# Patient Record
Sex: FEMALE | Race: WHITE | NOT HISPANIC OR LATINO | URBAN - METROPOLITAN AREA
[De-identification: names, ages, dates, MRNs, and addresses within clinical notes are randomized per-mention and may not be internally consistent; named-entity substitution may affect disease eponyms.]

---

## 2017-05-04 ENCOUNTER — OUTPATIENT (OUTPATIENT)
Dept: OUTPATIENT SERVICES | Facility: HOSPITAL | Age: 32
LOS: 1 days | End: 2017-05-04
Payer: COMMERCIAL

## 2017-05-04 DIAGNOSIS — Z01.818 ENCOUNTER FOR OTHER PREPROCEDURAL EXAMINATION: ICD-10-CM

## 2017-05-04 DIAGNOSIS — O34.212 MATERNAL CARE FOR VERTICAL SCAR FROM PREVIOUS CESAREAN DELIVERY: ICD-10-CM

## 2017-05-04 LAB
BLD GP AB SCN SERPL QL: NEGATIVE — SIGNIFICANT CHANGE UP
HCT VFR BLD CALC: 32.6 % — LOW (ref 34.5–45)
HGB BLD-MCNC: 10.9 G/DL — LOW (ref 11.5–15.5)
MCHC RBC-ENTMCNC: 26.2 PG — LOW (ref 27–34)
MCHC RBC-ENTMCNC: 33.4 GM/DL — SIGNIFICANT CHANGE UP (ref 32–36)
MCV RBC AUTO: 78.4 FL — LOW (ref 80–100)
PLATELET # BLD AUTO: 151 K/UL — SIGNIFICANT CHANGE UP (ref 150–400)
RBC # BLD: 4.16 M/UL — SIGNIFICANT CHANGE UP (ref 3.8–5.2)
RBC # FLD: 14.3 % — SIGNIFICANT CHANGE UP (ref 10.3–14.5)
RH IG SCN BLD-IMP: NEGATIVE — SIGNIFICANT CHANGE UP
WBC # BLD: 7.11 K/UL — SIGNIFICANT CHANGE UP (ref 3.8–10.5)
WBC # FLD AUTO: 7.11 K/UL — SIGNIFICANT CHANGE UP (ref 3.8–10.5)

## 2017-05-04 PROCEDURE — 86901 BLOOD TYPING SEROLOGIC RH(D): CPT

## 2017-05-04 PROCEDURE — G0463: CPT

## 2017-05-04 PROCEDURE — 86850 RBC ANTIBODY SCREEN: CPT

## 2017-05-04 PROCEDURE — 86900 BLOOD TYPING SEROLOGIC ABO: CPT

## 2017-05-04 PROCEDURE — 85027 COMPLETE CBC AUTOMATED: CPT

## 2017-05-05 ENCOUNTER — TRANSCRIPTION ENCOUNTER (OUTPATIENT)
Age: 32
End: 2017-05-05

## 2017-05-05 ENCOUNTER — INPATIENT (INPATIENT)
Facility: HOSPITAL | Age: 32
LOS: 2 days | Discharge: HOME | End: 2017-05-08
Attending: OBSTETRICS & GYNECOLOGY | Admitting: OBSTETRICS & GYNECOLOGY
Payer: COMMERCIAL

## 2017-05-05 VITALS — WEIGHT: 158.73 LBS | HEIGHT: 62 IN

## 2017-05-05 DIAGNOSIS — Z01.818 ENCOUNTER FOR OTHER PREPROCEDURAL EXAMINATION: ICD-10-CM

## 2017-05-05 DIAGNOSIS — O34.212 MATERNAL CARE FOR VERTICAL SCAR FROM PREVIOUS CESAREAN DELIVERY: ICD-10-CM

## 2017-05-05 LAB — T PALLIDUM AB TITR SER: NEGATIVE — SIGNIFICANT CHANGE UP

## 2017-05-05 RX ORDER — DEXAMETHASONE 0.5 MG/5ML
4 ELIXIR ORAL EVERY 6 HOURS
Qty: 0 | Refills: 0 | Status: DISCONTINUED | OUTPATIENT
Start: 2017-05-05 | End: 2017-05-06

## 2017-05-05 RX ORDER — METOCLOPRAMIDE HCL 10 MG
10 TABLET ORAL ONCE
Qty: 0 | Refills: 0 | Status: DISCONTINUED | OUTPATIENT
Start: 2017-05-05 | End: 2017-05-05

## 2017-05-05 RX ORDER — SODIUM CHLORIDE 9 MG/ML
1000 INJECTION, SOLUTION INTRAVENOUS
Qty: 0 | Refills: 0 | Status: DISCONTINUED | OUTPATIENT
Start: 2017-05-05 | End: 2017-05-05

## 2017-05-05 RX ORDER — OXYTOCIN 10 UNIT/ML
333.33 VIAL (ML) INJECTION
Qty: 20 | Refills: 0 | Status: DISCONTINUED | OUTPATIENT
Start: 2017-05-05 | End: 2017-05-08

## 2017-05-05 RX ORDER — LANOLIN
1 OINTMENT (GRAM) TOPICAL
Qty: 0 | Refills: 0 | Status: DISCONTINUED | OUTPATIENT
Start: 2017-05-05 | End: 2017-05-08

## 2017-05-05 RX ORDER — FERROUS SULFATE 325(65) MG
325 TABLET ORAL DAILY
Qty: 0 | Refills: 0 | Status: DISCONTINUED | OUTPATIENT
Start: 2017-05-05 | End: 2017-05-08

## 2017-05-05 RX ORDER — SIMETHICONE 80 MG/1
80 TABLET, CHEWABLE ORAL EVERY 4 HOURS
Qty: 0 | Refills: 0 | Status: DISCONTINUED | OUTPATIENT
Start: 2017-05-05 | End: 2017-05-08

## 2017-05-05 RX ORDER — CEFAZOLIN SODIUM 1 G
2000 VIAL (EA) INJECTION EVERY 8 HOURS
Qty: 0 | Refills: 0 | Status: COMPLETED | OUTPATIENT
Start: 2017-05-05 | End: 2017-05-06

## 2017-05-05 RX ORDER — KETOROLAC TROMETHAMINE 30 MG/ML
30 SYRINGE (ML) INJECTION EVERY 6 HOURS
Qty: 0 | Refills: 0 | Status: DISCONTINUED | OUTPATIENT
Start: 2017-05-05 | End: 2017-05-07

## 2017-05-05 RX ORDER — OXYCODONE HYDROCHLORIDE 5 MG/1
5 TABLET ORAL
Qty: 0 | Refills: 0 | Status: COMPLETED | OUTPATIENT
Start: 2017-05-05 | End: 2017-05-12

## 2017-05-05 RX ORDER — GLYCERIN ADULT
1 SUPPOSITORY, RECTAL RECTAL AT BEDTIME
Qty: 0 | Refills: 0 | Status: DISCONTINUED | OUTPATIENT
Start: 2017-05-05 | End: 2017-05-08

## 2017-05-05 RX ORDER — CEFAZOLIN SODIUM 1 G
2000 VIAL (EA) INJECTION ONCE
Qty: 0 | Refills: 0 | Status: COMPLETED | OUTPATIENT
Start: 2017-05-05 | End: 2017-05-05

## 2017-05-05 RX ORDER — DOCUSATE SODIUM 100 MG
100 CAPSULE ORAL
Qty: 0 | Refills: 0 | Status: DISCONTINUED | OUTPATIENT
Start: 2017-05-05 | End: 2017-05-08

## 2017-05-05 RX ORDER — SODIUM CHLORIDE 9 MG/ML
1000 INJECTION, SOLUTION INTRAVENOUS
Qty: 0 | Refills: 0 | Status: DISCONTINUED | OUTPATIENT
Start: 2017-05-05 | End: 2017-05-08

## 2017-05-05 RX ORDER — TETANUS TOXOID, REDUCED DIPHTHERIA TOXOID AND ACELLULAR PERTUSSIS VACCINE, ADSORBED 5; 2.5; 8; 8; 2.5 [IU]/.5ML; [IU]/.5ML; UG/.5ML; UG/.5ML; UG/.5ML
0.5 SUSPENSION INTRAMUSCULAR ONCE
Qty: 0 | Refills: 0 | Status: DISCONTINUED | OUTPATIENT
Start: 2017-05-05 | End: 2017-05-08

## 2017-05-05 RX ORDER — HEPARIN SODIUM 5000 [USP'U]/ML
5000 INJECTION INTRAVENOUS; SUBCUTANEOUS EVERY 12 HOURS
Qty: 0 | Refills: 0 | Status: DISCONTINUED | OUTPATIENT
Start: 2017-05-05 | End: 2017-05-08

## 2017-05-05 RX ORDER — OXYTOCIN 10 UNIT/ML
41.67 VIAL (ML) INJECTION
Qty: 20 | Refills: 0 | Status: DISCONTINUED | OUTPATIENT
Start: 2017-05-05 | End: 2017-05-08

## 2017-05-05 RX ORDER — OXYTOCIN 10 UNIT/ML
41.67 VIAL (ML) INJECTION
Qty: 20 | Refills: 0 | Status: DISCONTINUED | OUTPATIENT
Start: 2017-05-05 | End: 2017-05-05

## 2017-05-05 RX ORDER — CEFAZOLIN SODIUM 1 G
2000 VIAL (EA) INJECTION ONCE
Qty: 0 | Refills: 0 | Status: DISCONTINUED | OUTPATIENT
Start: 2017-05-05 | End: 2017-05-05

## 2017-05-05 RX ORDER — IBUPROFEN 200 MG
600 TABLET ORAL EVERY 6 HOURS
Qty: 0 | Refills: 0 | Status: COMPLETED | OUTPATIENT
Start: 2017-05-05 | End: 2018-04-03

## 2017-05-05 RX ORDER — ACETAMINOPHEN 500 MG
975 TABLET ORAL EVERY 6 HOURS
Qty: 0 | Refills: 0 | Status: COMPLETED | OUTPATIENT
Start: 2017-05-05 | End: 2018-04-03

## 2017-05-05 RX ORDER — DIPHENHYDRAMINE HCL 50 MG
25 CAPSULE ORAL EVERY 6 HOURS
Qty: 0 | Refills: 0 | Status: DISCONTINUED | OUTPATIENT
Start: 2017-05-05 | End: 2017-05-08

## 2017-05-05 RX ORDER — CITRIC ACID/SODIUM CITRATE 300-500 MG
15 SOLUTION, ORAL ORAL ONCE
Qty: 0 | Refills: 0 | Status: COMPLETED | OUTPATIENT
Start: 2017-05-05 | End: 2017-05-05

## 2017-05-05 RX ORDER — DEXAMETHASONE 0.5 MG/5ML
4 ELIXIR ORAL EVERY 6 HOURS
Qty: 0 | Refills: 0 | Status: DISCONTINUED | OUTPATIENT
Start: 2017-05-05 | End: 2017-05-05

## 2017-05-05 RX ORDER — FAMOTIDINE 10 MG/ML
20 INJECTION INTRAVENOUS ONCE
Qty: 0 | Refills: 0 | Status: COMPLETED | OUTPATIENT
Start: 2017-05-05 | End: 2017-05-05

## 2017-05-05 RX ORDER — INFLUENZA VIRUS VACCINE 15; 15; 15; 15 UG/.5ML; UG/.5ML; UG/.5ML; UG/.5ML
0.5 SUSPENSION INTRAMUSCULAR ONCE
Qty: 0 | Refills: 0 | Status: DISCONTINUED | OUTPATIENT
Start: 2017-05-05 | End: 2017-05-08

## 2017-05-05 RX ORDER — ONDANSETRON 8 MG/1
4 TABLET, FILM COATED ORAL EVERY 6 HOURS
Qty: 0 | Refills: 0 | Status: DISCONTINUED | OUTPATIENT
Start: 2017-05-05 | End: 2017-05-06

## 2017-05-05 RX ORDER — OXYCODONE HYDROCHLORIDE 5 MG/1
5 TABLET ORAL EVERY 4 HOURS
Qty: 0 | Refills: 0 | Status: COMPLETED | OUTPATIENT
Start: 2017-05-06 | End: 2017-05-13

## 2017-05-05 RX ORDER — NALOXONE HYDROCHLORIDE 4 MG/.1ML
0.1 SPRAY NASAL
Qty: 0 | Refills: 0 | Status: DISCONTINUED | OUTPATIENT
Start: 2017-05-05 | End: 2017-05-06

## 2017-05-05 RX ORDER — ONDANSETRON 8 MG/1
4 TABLET, FILM COATED ORAL EVERY 6 HOURS
Qty: 0 | Refills: 0 | Status: DISCONTINUED | OUTPATIENT
Start: 2017-05-05 | End: 2017-05-05

## 2017-05-05 RX ORDER — SODIUM CHLORIDE 9 MG/ML
1000 INJECTION, SOLUTION INTRAVENOUS ONCE
Qty: 0 | Refills: 0 | Status: COMPLETED | OUTPATIENT
Start: 2017-05-05 | End: 2017-05-05

## 2017-05-05 RX ADMIN — Medication 100 MILLIGRAM(S): at 12:24

## 2017-05-05 RX ADMIN — HEPARIN SODIUM 5000 UNIT(S): 5000 INJECTION INTRAVENOUS; SUBCUTANEOUS at 20:47

## 2017-05-05 RX ADMIN — FAMOTIDINE 20 MILLIGRAM(S): 10 INJECTION INTRAVENOUS at 11:54

## 2017-05-05 RX ADMIN — Medication 15 MILLILITER(S): at 11:54

## 2017-05-05 RX ADMIN — Medication 100 MILLIGRAM(S): at 21:59

## 2017-05-05 RX ADMIN — Medication 30 MILLIGRAM(S): at 20:47

## 2017-05-05 RX ADMIN — Medication 30 MILLIGRAM(S): at 14:22

## 2017-05-05 RX ADMIN — SODIUM CHLORIDE 2000 MILLILITER(S): 9 INJECTION, SOLUTION INTRAVENOUS at 11:45

## 2017-05-05 RX ADMIN — Medication 125 MILLIUNIT(S)/MIN: at 14:22

## 2017-05-05 RX ADMIN — Medication 30 MILLIGRAM(S): at 21:20

## 2017-05-05 RX ADMIN — Medication 125 MILLIUNIT(S)/MIN: at 14:20

## 2017-05-06 LAB
HCT VFR BLD CALC: 28 % — LOW (ref 34.5–45)
HGB BLD-MCNC: 10.1 G/DL — LOW (ref 11.5–15.5)
KLEIHAUER-BETKE CALCULATION: 0.1 % — SIGNIFICANT CHANGE UP (ref 0–0.3)
MCHC RBC-ENTMCNC: 28.2 PG — SIGNIFICANT CHANGE UP (ref 27–34)
MCHC RBC-ENTMCNC: 35.9 GM/DL — SIGNIFICANT CHANGE UP (ref 32–36)
MCV RBC AUTO: 78.7 FL — LOW (ref 80–100)
PLATELET # BLD AUTO: 114 K/UL — LOW (ref 150–400)
RBC # BLD: 3.56 M/UL — LOW (ref 3.8–5.2)
RBC # FLD: 13.6 % — SIGNIFICANT CHANGE UP (ref 10.3–14.5)
WBC # BLD: 7.9 K/UL — SIGNIFICANT CHANGE UP (ref 3.8–10.5)
WBC # FLD AUTO: 7.9 K/UL — SIGNIFICANT CHANGE UP (ref 3.8–10.5)

## 2017-05-06 RX ORDER — OXYCODONE HYDROCHLORIDE 5 MG/1
5 TABLET ORAL
Qty: 0 | Refills: 0 | Status: DISCONTINUED | OUTPATIENT
Start: 2017-05-06 | End: 2017-05-08

## 2017-05-06 RX ORDER — ACETAMINOPHEN 500 MG
975 TABLET ORAL EVERY 6 HOURS
Qty: 0 | Refills: 0 | Status: DISCONTINUED | OUTPATIENT
Start: 2017-05-06 | End: 2017-05-08

## 2017-05-06 RX ORDER — IBUPROFEN 200 MG
600 TABLET ORAL EVERY 6 HOURS
Qty: 0 | Refills: 0 | Status: DISCONTINUED | OUTPATIENT
Start: 2017-05-06 | End: 2017-05-06

## 2017-05-06 RX ORDER — OXYCODONE HYDROCHLORIDE 5 MG/1
5 TABLET ORAL EVERY 4 HOURS
Qty: 0 | Refills: 0 | Status: DISCONTINUED | OUTPATIENT
Start: 2017-05-06 | End: 2017-05-08

## 2017-05-06 RX ADMIN — Medication 975 MILLIGRAM(S): at 10:00

## 2017-05-06 RX ADMIN — Medication 975 MILLIGRAM(S): at 15:24

## 2017-05-06 RX ADMIN — OXYCODONE HYDROCHLORIDE 5 MILLIGRAM(S): 5 TABLET ORAL at 15:24

## 2017-05-06 RX ADMIN — HEPARIN SODIUM 5000 UNIT(S): 5000 INJECTION INTRAVENOUS; SUBCUTANEOUS at 21:07

## 2017-05-06 RX ADMIN — Medication 975 MILLIGRAM(S): at 21:07

## 2017-05-06 RX ADMIN — OXYCODONE HYDROCHLORIDE 5 MILLIGRAM(S): 5 TABLET ORAL at 09:27

## 2017-05-06 RX ADMIN — Medication 975 MILLIGRAM(S): at 21:45

## 2017-05-06 RX ADMIN — Medication 325 MILLIGRAM(S): at 11:48

## 2017-05-06 RX ADMIN — Medication 1 TABLET(S): at 11:47

## 2017-05-06 RX ADMIN — OXYCODONE HYDROCHLORIDE 5 MILLIGRAM(S): 5 TABLET ORAL at 12:20

## 2017-05-06 RX ADMIN — Medication 25 MILLIGRAM(S): at 01:49

## 2017-05-06 RX ADMIN — OXYCODONE HYDROCHLORIDE 5 MILLIGRAM(S): 5 TABLET ORAL at 13:00

## 2017-05-06 RX ADMIN — Medication 30 MILLIGRAM(S): at 11:48

## 2017-05-06 RX ADMIN — OXYCODONE HYDROCHLORIDE 5 MILLIGRAM(S): 5 TABLET ORAL at 18:13

## 2017-05-06 RX ADMIN — OXYCODONE HYDROCHLORIDE 5 MILLIGRAM(S): 5 TABLET ORAL at 21:07

## 2017-05-06 RX ADMIN — Medication 30 MILLIGRAM(S): at 12:15

## 2017-05-06 RX ADMIN — Medication 975 MILLIGRAM(S): at 09:27

## 2017-05-06 RX ADMIN — OXYCODONE HYDROCHLORIDE 5 MILLIGRAM(S): 5 TABLET ORAL at 16:00

## 2017-05-06 RX ADMIN — OXYCODONE HYDROCHLORIDE 5 MILLIGRAM(S): 5 TABLET ORAL at 10:00

## 2017-05-06 RX ADMIN — Medication 30 MILLIGRAM(S): at 18:13

## 2017-05-06 RX ADMIN — Medication 100 MILLIGRAM(S): at 06:24

## 2017-05-06 RX ADMIN — OXYCODONE HYDROCHLORIDE 5 MILLIGRAM(S): 5 TABLET ORAL at 21:45

## 2017-05-06 RX ADMIN — Medication 975 MILLIGRAM(S): at 16:10

## 2017-05-06 RX ADMIN — HEPARIN SODIUM 5000 UNIT(S): 5000 INJECTION INTRAVENOUS; SUBCUTANEOUS at 09:27

## 2017-05-06 RX ADMIN — Medication 30 MILLIGRAM(S): at 18:58

## 2017-05-06 RX ADMIN — OXYCODONE HYDROCHLORIDE 5 MILLIGRAM(S): 5 TABLET ORAL at 18:58

## 2017-05-07 RX ORDER — IBUPROFEN 200 MG
600 TABLET ORAL EVERY 6 HOURS
Qty: 0 | Refills: 0 | Status: DISCONTINUED | OUTPATIENT
Start: 2017-05-07 | End: 2017-05-08

## 2017-05-07 RX ADMIN — HEPARIN SODIUM 5000 UNIT(S): 5000 INJECTION INTRAVENOUS; SUBCUTANEOUS at 08:00

## 2017-05-07 RX ADMIN — OXYCODONE HYDROCHLORIDE 5 MILLIGRAM(S): 5 TABLET ORAL at 03:35

## 2017-05-07 RX ADMIN — Medication 600 MILLIGRAM(S): at 23:59

## 2017-05-07 RX ADMIN — OXYCODONE HYDROCHLORIDE 5 MILLIGRAM(S): 5 TABLET ORAL at 03:03

## 2017-05-07 RX ADMIN — Medication 325 MILLIGRAM(S): at 13:15

## 2017-05-07 RX ADMIN — OXYCODONE HYDROCHLORIDE 5 MILLIGRAM(S): 5 TABLET ORAL at 09:40

## 2017-05-07 RX ADMIN — Medication 975 MILLIGRAM(S): at 13:45

## 2017-05-07 RX ADMIN — OXYCODONE HYDROCHLORIDE 5 MILLIGRAM(S): 5 TABLET ORAL at 19:10

## 2017-05-07 RX ADMIN — Medication 600 MILLIGRAM(S): at 17:10

## 2017-05-07 RX ADMIN — Medication 600 MILLIGRAM(S): at 16:40

## 2017-05-07 RX ADMIN — Medication 975 MILLIGRAM(S): at 20:33

## 2017-05-07 RX ADMIN — Medication 600 MILLIGRAM(S): at 09:40

## 2017-05-07 RX ADMIN — Medication 975 MILLIGRAM(S): at 13:15

## 2017-05-07 RX ADMIN — Medication 1 TABLET(S): at 13:15

## 2017-05-07 RX ADMIN — OXYCODONE HYDROCHLORIDE 5 MILLIGRAM(S): 5 TABLET ORAL at 09:09

## 2017-05-07 RX ADMIN — Medication 975 MILLIGRAM(S): at 21:15

## 2017-05-07 RX ADMIN — OXYCODONE HYDROCHLORIDE 5 MILLIGRAM(S): 5 TABLET ORAL at 00:10

## 2017-05-07 RX ADMIN — Medication 600 MILLIGRAM(S): at 09:10

## 2017-05-07 RX ADMIN — Medication 975 MILLIGRAM(S): at 05:32

## 2017-05-07 RX ADMIN — OXYCODONE HYDROCHLORIDE 5 MILLIGRAM(S): 5 TABLET ORAL at 18:00

## 2017-05-07 RX ADMIN — OXYCODONE HYDROCHLORIDE 5 MILLIGRAM(S): 5 TABLET ORAL at 00:45

## 2017-05-07 RX ADMIN — Medication 600 MILLIGRAM(S): at 03:03

## 2017-05-07 RX ADMIN — OXYCODONE HYDROCHLORIDE 5 MILLIGRAM(S): 5 TABLET ORAL at 05:32

## 2017-05-07 RX ADMIN — Medication 600 MILLIGRAM(S): at 03:35

## 2017-05-07 NOTE — PROVIDER CONTACT NOTE (MEDICATION) - RECOMMENDATIONS
educated the pt about the importance of taking heparin.she said she is going home tomorrow and she doesn't want to take it today.

## 2017-05-08 ENCOUNTER — TRANSCRIPTION ENCOUNTER (OUTPATIENT)
Age: 32
End: 2017-05-08

## 2017-05-08 VITALS
SYSTOLIC BLOOD PRESSURE: 98 MMHG | HEART RATE: 72 BPM | DIASTOLIC BLOOD PRESSURE: 65 MMHG | RESPIRATION RATE: 18 BRPM | TEMPERATURE: 98 F

## 2017-05-08 LAB
HCT VFR BLD CALC: 35.7 % — SIGNIFICANT CHANGE UP (ref 34.5–45)
HGB BLD-MCNC: 12.6 G/DL — SIGNIFICANT CHANGE UP (ref 11.5–15.5)
MCHC RBC-ENTMCNC: 28.2 PG — SIGNIFICANT CHANGE UP (ref 27–34)
MCHC RBC-ENTMCNC: 35.2 GM/DL — SIGNIFICANT CHANGE UP (ref 32–36)
MCV RBC AUTO: 80 FL — SIGNIFICANT CHANGE UP (ref 80–100)
PLATELET # BLD AUTO: 154 K/UL — SIGNIFICANT CHANGE UP (ref 150–400)
RBC # BLD: 4.47 M/UL — SIGNIFICANT CHANGE UP (ref 3.8–5.2)
RBC # FLD: 13.9 % — SIGNIFICANT CHANGE UP (ref 10.3–14.5)
WBC # BLD: 7.2 K/UL — SIGNIFICANT CHANGE UP (ref 3.8–10.5)
WBC # FLD AUTO: 7.2 K/UL — SIGNIFICANT CHANGE UP (ref 3.8–10.5)

## 2017-05-08 PROCEDURE — 86850 RBC ANTIBODY SCREEN: CPT

## 2017-05-08 PROCEDURE — 59025 FETAL NON-STRESS TEST: CPT

## 2017-05-08 PROCEDURE — 86780 TREPONEMA PALLIDUM: CPT

## 2017-05-08 PROCEDURE — 86901 BLOOD TYPING SEROLOGIC RH(D): CPT

## 2017-05-08 PROCEDURE — 85460 HEMOGLOBIN FETAL: CPT

## 2017-05-08 PROCEDURE — 86900 BLOOD TYPING SEROLOGIC ABO: CPT

## 2017-05-08 PROCEDURE — 59050 FETAL MONITOR W/REPORT: CPT

## 2017-05-08 PROCEDURE — 85027 COMPLETE CBC AUTOMATED: CPT

## 2017-05-08 RX ADMIN — Medication 600 MILLIGRAM(S): at 11:41

## 2017-05-08 RX ADMIN — OXYCODONE HYDROCHLORIDE 5 MILLIGRAM(S): 5 TABLET ORAL at 02:43

## 2017-05-08 RX ADMIN — Medication 600 MILLIGRAM(S): at 05:51

## 2017-05-08 RX ADMIN — Medication 1 TABLET(S): at 11:42

## 2017-05-08 RX ADMIN — Medication 600 MILLIGRAM(S): at 00:40

## 2017-05-08 RX ADMIN — OXYCODONE HYDROCHLORIDE 5 MILLIGRAM(S): 5 TABLET ORAL at 11:40

## 2017-05-08 RX ADMIN — Medication 975 MILLIGRAM(S): at 03:23

## 2017-05-08 RX ADMIN — Medication 325 MILLIGRAM(S): at 11:42

## 2017-05-08 RX ADMIN — Medication 975 MILLIGRAM(S): at 02:43

## 2017-05-08 RX ADMIN — OXYCODONE HYDROCHLORIDE 5 MILLIGRAM(S): 5 TABLET ORAL at 03:25

## 2017-05-08 RX ADMIN — Medication 600 MILLIGRAM(S): at 06:50

## 2017-05-08 NOTE — DISCHARGE NOTE OB - PATIENT PORTAL LINK FT
“You can access the [employer] Patient Portal, offered by [facility], by registering with the following website: [email or web address]”

## 2017-05-08 NOTE — DISCHARGE NOTE OB - CARE PROVIDER_API CALL
Amber Ho), Obstetrics and Gynecology  [address]  [place] [address]  Phone: [phone]  Fax: [phone]

## 2017-05-08 NOTE — DISCHARGE NOTE OB - CARE PLAN
Principal Discharge DX:	 delivery delivered  Goal:	Recovery  Instructions for follow-up, activity and diet:	Regular diet, activity as tolerated, follow up with MD in 4 weeks.

## 2017-06-26 ENCOUNTER — APPOINTMENT (OUTPATIENT)
Dept: OBGYN | Facility: CLINIC | Age: 32
End: 2017-06-26

## 2017-06-26 VITALS — SYSTOLIC BLOOD PRESSURE: 89 MMHG | DIASTOLIC BLOOD PRESSURE: 59 MMHG | WEIGHT: 137 LBS

## 2017-06-26 DIAGNOSIS — Z30.430 ENCOUNTER FOR INSERTION OF INTRAUTERINE CONTRACEPTIVE DEVICE: ICD-10-CM

## 2017-06-26 LAB
HCG UR QL: NEGATIVE
QUALITY CONTROL: YES

## 2017-06-28 ENCOUNTER — RESULT REVIEW (OUTPATIENT)
Age: 32
End: 2017-06-28

## 2017-06-28 LAB
C TRACH RRNA SPEC QL NAA+PROBE: NORMAL
N GONORRHOEA RRNA SPEC QL NAA+PROBE: NORMAL
SOURCE AMPLIFICATION: NORMAL

## 2017-09-11 ENCOUNTER — APPOINTMENT (OUTPATIENT)
Dept: OBGYN | Facility: CLINIC | Age: 32
End: 2017-09-11

## 2018-03-13 ENCOUNTER — RESULT REVIEW (OUTPATIENT)
Age: 33
End: 2018-03-13

## 2018-08-18 NOTE — DISCHARGE NOTE OB - VISION (WITH CORRECTIVE LENSES IF THE PATIENT USUALLY WEARS THEM):
supplemental O2
Normal vision: sees adequately in most situations; can see medication labels, newsprint

## 2018-11-05 NOTE — PATIENT PROFILE OB - AS LABOR RUPTURE OF MEMBRANES YN
How Severe Are Your Spot(S)?: mild What Is The Reason For Today's Visit?: Upper Body Skin Exam Additional History: Patient has a lesion on the left upper arm that she would to have evaluated.  Lesion has increased in size see l&d notes

## 2018-12-03 ENCOUNTER — INPATIENT (INPATIENT)
Facility: HOSPITAL | Age: 33
LOS: 0 days | Discharge: HOME | DRG: 195 | End: 2018-12-04
Attending: INTERNAL MEDICINE | Admitting: INTERNAL MEDICINE
Payer: COMMERCIAL

## 2018-12-03 VITALS
WEIGHT: 126.1 LBS | RESPIRATION RATE: 20 BRPM | DIASTOLIC BLOOD PRESSURE: 66 MMHG | SYSTOLIC BLOOD PRESSURE: 98 MMHG | TEMPERATURE: 98 F | HEART RATE: 97 BPM | OXYGEN SATURATION: 97 %

## 2018-12-03 DIAGNOSIS — J18.9 PNEUMONIA, UNSPECIFIED ORGANISM: ICD-10-CM

## 2018-12-03 LAB
ALBUMIN SERPL ELPH-MCNC: 4 G/DL — SIGNIFICANT CHANGE UP (ref 3.3–5)
ALP SERPL-CCNC: 54 U/L — SIGNIFICANT CHANGE UP (ref 40–120)
ALT FLD-CCNC: 9 U/L — LOW (ref 10–45)
ANION GAP SERPL CALC-SCNC: 12 MMOL/L — SIGNIFICANT CHANGE UP (ref 5–17)
AST SERPL-CCNC: 19 U/L — SIGNIFICANT CHANGE UP (ref 10–40)
BASOPHILS # BLD AUTO: 0 K/UL — SIGNIFICANT CHANGE UP (ref 0–0.2)
BASOPHILS NFR BLD AUTO: 0.2 % — SIGNIFICANT CHANGE UP (ref 0–2)
BILIRUB SERPL-MCNC: 0.4 MG/DL — SIGNIFICANT CHANGE UP (ref 0.2–1.2)
BUN SERPL-MCNC: 10 MG/DL — SIGNIFICANT CHANGE UP (ref 7–23)
CALCIUM SERPL-MCNC: 8.9 MG/DL — SIGNIFICANT CHANGE UP (ref 8.4–10.5)
CHLORIDE SERPL-SCNC: 101 MMOL/L — SIGNIFICANT CHANGE UP (ref 96–108)
CO2 SERPL-SCNC: 26 MMOL/L — SIGNIFICANT CHANGE UP (ref 22–31)
CREAT SERPL-MCNC: 0.57 MG/DL — SIGNIFICANT CHANGE UP (ref 0.5–1.3)
EOSINOPHIL # BLD AUTO: 0.3 K/UL — SIGNIFICANT CHANGE UP (ref 0–0.5)
EOSINOPHIL NFR BLD AUTO: 7.8 % — HIGH (ref 0–6)
GLUCOSE SERPL-MCNC: 118 MG/DL — HIGH (ref 70–99)
HCG UR QL: NEGATIVE — SIGNIFICANT CHANGE UP
HCT VFR BLD CALC: 39.2 % — SIGNIFICANT CHANGE UP (ref 34.5–45)
HGB BLD-MCNC: 13.7 G/DL — SIGNIFICANT CHANGE UP (ref 11.5–15.5)
LYMPHOCYTES # BLD AUTO: 1.3 K/UL — SIGNIFICANT CHANGE UP (ref 1–3.3)
LYMPHOCYTES # BLD AUTO: 29.7 % — SIGNIFICANT CHANGE UP (ref 13–44)
MCHC RBC-ENTMCNC: 29.5 PG — SIGNIFICANT CHANGE UP (ref 27–34)
MCHC RBC-ENTMCNC: 34.9 GM/DL — SIGNIFICANT CHANGE UP (ref 32–36)
MCV RBC AUTO: 84.3 FL — SIGNIFICANT CHANGE UP (ref 80–100)
MONOCYTES # BLD AUTO: 0.4 K/UL — SIGNIFICANT CHANGE UP (ref 0–0.9)
MONOCYTES NFR BLD AUTO: 9.5 % — SIGNIFICANT CHANGE UP (ref 2–14)
NEUTROPHILS # BLD AUTO: 2.4 K/UL — SIGNIFICANT CHANGE UP (ref 1.8–7.4)
NEUTROPHILS NFR BLD AUTO: 52.8 % — SIGNIFICANT CHANGE UP (ref 43–77)
PLATELET # BLD AUTO: 114 K/UL — LOW (ref 150–400)
POTASSIUM SERPL-MCNC: 3.2 MMOL/L — LOW (ref 3.5–5.3)
POTASSIUM SERPL-SCNC: 3.2 MMOL/L — LOW (ref 3.5–5.3)
PROT SERPL-MCNC: 7 G/DL — SIGNIFICANT CHANGE UP (ref 6–8.3)
RAPID RVP RESULT: SIGNIFICANT CHANGE UP
RBC # BLD: 4.64 M/UL — SIGNIFICANT CHANGE UP (ref 3.8–5.2)
RBC # FLD: 13.8 % — SIGNIFICANT CHANGE UP (ref 10.3–14.5)
SODIUM SERPL-SCNC: 139 MMOL/L — SIGNIFICANT CHANGE UP (ref 135–145)
WBC # BLD: 4.4 K/UL — SIGNIFICANT CHANGE UP (ref 3.8–10.5)
WBC # FLD AUTO: 4.4 K/UL — SIGNIFICANT CHANGE UP (ref 3.8–10.5)

## 2018-12-03 PROCEDURE — 71250 CT THORAX DX C-: CPT | Mod: 26

## 2018-12-03 PROCEDURE — 71046 X-RAY EXAM CHEST 2 VIEWS: CPT | Mod: 26

## 2018-12-03 PROCEDURE — 99285 EMERGENCY DEPT VISIT HI MDM: CPT

## 2018-12-03 RX ORDER — DEXTROSE MONOHYDRATE, SODIUM CHLORIDE, AND POTASSIUM CHLORIDE 50; .745; 4.5 G/1000ML; G/1000ML; G/1000ML
1000 INJECTION, SOLUTION INTRAVENOUS
Qty: 0 | Refills: 0 | Status: DISCONTINUED | OUTPATIENT
Start: 2018-12-03 | End: 2018-12-04

## 2018-12-03 RX ORDER — GARLIC 1000 MG
0 CAPSULE ORAL
Qty: 0 | Refills: 0 | COMMUNITY

## 2018-12-03 RX ORDER — SODIUM CHLORIDE 9 MG/ML
1000 INJECTION INTRAMUSCULAR; INTRAVENOUS; SUBCUTANEOUS ONCE
Qty: 0 | Refills: 0 | Status: COMPLETED | OUTPATIENT
Start: 2018-12-03 | End: 2018-12-03

## 2018-12-03 RX ORDER — AZITHROMYCIN 500 MG/1
500 TABLET, FILM COATED ORAL ONCE
Qty: 0 | Refills: 0 | Status: COMPLETED | OUTPATIENT
Start: 2018-12-03 | End: 2018-12-03

## 2018-12-03 RX ORDER — HEPARIN SODIUM 5000 [USP'U]/ML
5000 INJECTION INTRAVENOUS; SUBCUTANEOUS EVERY 12 HOURS
Qty: 0 | Refills: 0 | Status: DISCONTINUED | OUTPATIENT
Start: 2018-12-03 | End: 2018-12-04

## 2018-12-03 RX ORDER — SODIUM CHLORIDE 9 MG/ML
1000 INJECTION, SOLUTION INTRAVENOUS
Qty: 0 | Refills: 0 | Status: DISCONTINUED | OUTPATIENT
Start: 2018-12-03 | End: 2018-12-03

## 2018-12-03 RX ORDER — ONDANSETRON 8 MG/1
4 TABLET, FILM COATED ORAL ONCE
Qty: 0 | Refills: 0 | Status: COMPLETED | OUTPATIENT
Start: 2018-12-03 | End: 2018-12-03

## 2018-12-03 RX ORDER — INFLUENZA VIRUS VACCINE 15; 15; 15; 15 UG/.5ML; UG/.5ML; UG/.5ML; UG/.5ML
0.5 SUSPENSION INTRAMUSCULAR ONCE
Qty: 0 | Refills: 0 | Status: DISCONTINUED | OUTPATIENT
Start: 2018-12-03 | End: 2018-12-04

## 2018-12-03 RX ORDER — CEFTRIAXONE 500 MG/1
1 INJECTION, POWDER, FOR SOLUTION INTRAMUSCULAR; INTRAVENOUS ONCE
Qty: 0 | Refills: 0 | Status: COMPLETED | OUTPATIENT
Start: 2018-12-03 | End: 2018-12-03

## 2018-12-03 RX ORDER — ECHINACEA PURPUREA EXTRACT 125 MG
0 TABLET ORAL
Qty: 0 | Refills: 0 | COMMUNITY

## 2018-12-03 RX ORDER — IBUPROFEN 200 MG
400 TABLET ORAL ONCE
Qty: 0 | Refills: 0 | Status: COMPLETED | OUTPATIENT
Start: 2018-12-03 | End: 2018-12-03

## 2018-12-03 RX ADMIN — CEFTRIAXONE 100 GRAM(S): 500 INJECTION, POWDER, FOR SOLUTION INTRAMUSCULAR; INTRAVENOUS at 15:27

## 2018-12-03 RX ADMIN — SODIUM CHLORIDE 1000 MILLILITER(S): 9 INJECTION INTRAMUSCULAR; INTRAVENOUS; SUBCUTANEOUS at 17:36

## 2018-12-03 RX ADMIN — ONDANSETRON 4 MILLIGRAM(S): 8 TABLET, FILM COATED ORAL at 17:35

## 2018-12-03 RX ADMIN — Medication 400 MILLIGRAM(S): at 16:42

## 2018-12-03 RX ADMIN — AZITHROMYCIN 250 MILLIGRAM(S): 500 TABLET, FILM COATED ORAL at 16:40

## 2018-12-03 RX ADMIN — SODIUM CHLORIDE 1000 MILLILITER(S): 9 INJECTION INTRAMUSCULAR; INTRAVENOUS; SUBCUTANEOUS at 17:37

## 2018-12-03 RX ADMIN — DEXTROSE MONOHYDRATE, SODIUM CHLORIDE, AND POTASSIUM CHLORIDE 125 MILLILITER(S): 50; .745; 4.5 INJECTION, SOLUTION INTRAVENOUS at 18:52

## 2018-12-03 RX ADMIN — Medication 400 MILLIGRAM(S): at 17:12

## 2018-12-03 RX ADMIN — SODIUM CHLORIDE 1000 MILLILITER(S): 9 INJECTION INTRAMUSCULAR; INTRAVENOUS; SUBCUTANEOUS at 15:21

## 2018-12-03 RX ADMIN — SODIUM CHLORIDE 1000 MILLILITER(S): 9 INJECTION INTRAMUSCULAR; INTRAVENOUS; SUBCUTANEOUS at 18:29

## 2018-12-03 RX ADMIN — CEFTRIAXONE 1 GRAM(S): 500 INJECTION, POWDER, FOR SOLUTION INTRAMUSCULAR; INTRAVENOUS at 16:14

## 2018-12-03 RX ADMIN — AZITHROMYCIN 500 MILLIGRAM(S): 500 TABLET, FILM COATED ORAL at 17:16

## 2018-12-03 NOTE — H&P ADULT - HISTORY OF PRESENT ILLNESS
33y female w no sig PMhx p/w chills and dx of PNA.   Pt was seen at urgent care 3 wks ago for cough and chills, dx w/ left sided pna, and dced w/ PO penicillin.   Pt had clinical improvement until several days ago when she began experiencing chills again; she returned to urgent care today and was found to have new right sided pna, w/ resolution of her initial left sided pna.   She was then instructed to come to the ED for possible IV antibx.

## 2018-12-03 NOTE — ED PROVIDER NOTE - OBJECTIVE STATEMENT
33y f w no sig PMhx p/w chills and dx of PNA. Pt was seen at urgent care 3 wks ago for cough and chills, dx w/ left sided pna, and dced w/ PO penicillin. Pt had clinical improvement until several days ago when she began experiencing chills again; she returned to urgent care today and was found to have new right sided pna, w/ resolution of her initial left sided pna. She was then instructed to come to the ED for possible IV antibx.

## 2018-12-03 NOTE — ED PROVIDER NOTE - MEDICAL DECISION MAKING DETAILS
Otherwise healthy 33 y F, no immunosouppression, prior L base of lung, treated 3 wk ago on avelox, now c malaise, back pain, outpt + R middle lobe pna.  Will obtain XRs, labs, reassess.  Likely abx and d/c on augmentin/doxy.  Well appearing, nontoxic, vss, does not meet sepsis criteria.  --BMM

## 2018-12-03 NOTE — ED ADULT NURSE NOTE - NSIMPLEMENTINTERV_GEN_ALL_ED
Implemented All Universal Safety Interventions:  [place] to call system. Call bell, personal items and telephone within reach. Instruct patient to call for assistance. Room bathroom lighting operational. Non-slip footwear when patient is off stretcher. Physically safe environment: no spills, clutter or unnecessary equipment. Stretcher in lowest position, wheels locked, appropriate side rails in place.

## 2018-12-03 NOTE — ED ADULT NURSE REASSESSMENT NOTE - NS ED NURSE REASSESS COMMENT FT1
Report received from Kvng BOATENG. Patient is a/ox3, reports feeling "much better". Patient denies shortness of breath, chest pain, palpitations, dizziness, abdomen pain, n/v/d. VSS, afebrile. Patient receiving NS/d5/40meq potassium IV 125ml/hr. Iv clean/dry intact. Patient awaiting bed at this time. Safety maintained.
Complain denies complain of generalized body aches at this time, but endorses nausea. Pt given Zofran 4mg IVP as ordered.

## 2018-12-03 NOTE — ED PROVIDER NOTE - PROGRESS NOTE DETAILS
Continues to be asymptomatic, well appearing, nontoxic.  Mildly nauseated.  BP low 90s/60s, pt states typically closer to 100s/60s  but has h/o systolic that low.  Noted in room to be 86/52, no symptoms associated, getting IV abx and fluids.  Long d/w her re need to stay for IV abx, fluids, and monitoring given hypotension.  She is amenable.  Still does not meet sepsis criteria and I do not believe this represents severe sepsis/septic shock given lack of associated symptoms.  Will admit and rpt NS bolus.  --BMM

## 2018-12-03 NOTE — ED ADULT NURSE NOTE - OBJECTIVE STATEMENT
33 yrs old female with a h/o PNA was sent to the ER by [facility] to be treated for RML PNA. As per pt she had PNA 3 weeks ago in her left lobe and was treated with oral antibiotics, however on Saturday she started to experience chills and generalized body aches with no improvement after taking Tylenols. Pt reported that she went to urgent care today and was diagnosed via Xray with right lobe pNA . Pt denies chest pain , SOB or palpitations. Denies recent travel. sick contact. Pt reports that she work from home and has 3 children .

## 2018-12-03 NOTE — H&P ADULT - NSHPLABSRESULTS_GEN_ALL_CORE
13.7   4.4   )-----------( 114      ( 03 Dec 2018 15:15 )             39.2       12-03    139  |  101  |  10  ----------------------------<  118<H>  3.2<L>   |  26  |  0.57    Ca    8.9      03 Dec 2018 15:15    TPro  7.0  /  Alb  4.0  /  TBili  0.4  /  DBili  x   /  AST  19  /  ALT  9<L>  /  AlkPhos  54  12-03                      Lactate Trend            CAPILLARY BLOOD GLUCOSE

## 2018-12-03 NOTE — ED ADULT NURSE NOTE - CHPI ED NUR SYMPTOMS NEG
no shortness of breath/no wheezing/no edema/no hemoptysis/no cough/no diaphoresis/no fever/no headache/no chills

## 2018-12-04 ENCOUNTER — TRANSCRIPTION ENCOUNTER (OUTPATIENT)
Age: 33
End: 2018-12-04

## 2018-12-04 VITALS
RESPIRATION RATE: 18 BRPM | SYSTOLIC BLOOD PRESSURE: 99 MMHG | DIASTOLIC BLOOD PRESSURE: 61 MMHG | OXYGEN SATURATION: 99 % | TEMPERATURE: 98 F | HEART RATE: 77 BPM

## 2018-12-04 DIAGNOSIS — J18.9 PNEUMONIA, UNSPECIFIED ORGANISM: ICD-10-CM

## 2018-12-04 LAB
ANION GAP SERPL CALC-SCNC: 10 MMOL/L — SIGNIFICANT CHANGE UP (ref 5–17)
BUN SERPL-MCNC: 8 MG/DL — SIGNIFICANT CHANGE UP (ref 7–23)
CALCIUM SERPL-MCNC: 7.8 MG/DL — LOW (ref 8.4–10.5)
CHLORIDE SERPL-SCNC: 112 MMOL/L — HIGH (ref 96–108)
CO2 SERPL-SCNC: 21 MMOL/L — LOW (ref 22–31)
CREAT SERPL-MCNC: 0.54 MG/DL — SIGNIFICANT CHANGE UP (ref 0.5–1.3)
GLUCOSE SERPL-MCNC: 103 MG/DL — HIGH (ref 70–99)
HCT VFR BLD CALC: 32.7 % — LOW (ref 34.5–45)
HGB BLD-MCNC: 11.1 G/DL — LOW (ref 11.5–15.5)
LEGIONELLA AG UR QL: NEGATIVE — SIGNIFICANT CHANGE UP
LEGIONELLA AG UR QL: NEGATIVE — SIGNIFICANT CHANGE UP
MCHC RBC-ENTMCNC: 28.2 PG — SIGNIFICANT CHANGE UP (ref 27–34)
MCHC RBC-ENTMCNC: 33.9 GM/DL — SIGNIFICANT CHANGE UP (ref 32–36)
MCV RBC AUTO: 83.2 FL — SIGNIFICANT CHANGE UP (ref 80–100)
PLATELET # BLD AUTO: 112 K/UL — LOW (ref 150–400)
POTASSIUM SERPL-MCNC: 4.2 MMOL/L — SIGNIFICANT CHANGE UP (ref 3.5–5.3)
POTASSIUM SERPL-SCNC: 4.2 MMOL/L — SIGNIFICANT CHANGE UP (ref 3.5–5.3)
PROCALCITONIN SERPL-MCNC: 0.07 NG/ML — SIGNIFICANT CHANGE UP (ref 0.02–0.1)
RBC # BLD: 3.93 M/UL — SIGNIFICANT CHANGE UP (ref 3.8–5.2)
RBC # FLD: 14.4 % — SIGNIFICANT CHANGE UP (ref 10.3–14.5)
SODIUM SERPL-SCNC: 143 MMOL/L — SIGNIFICANT CHANGE UP (ref 135–145)
WBC # BLD: 4.64 K/UL — SIGNIFICANT CHANGE UP (ref 3.8–10.5)
WBC # FLD AUTO: 4.64 K/UL — SIGNIFICANT CHANGE UP (ref 3.8–10.5)

## 2018-12-04 PROCEDURE — 99285 EMERGENCY DEPT VISIT HI MDM: CPT | Mod: 25

## 2018-12-04 PROCEDURE — 96375 TX/PRO/DX INJ NEW DRUG ADDON: CPT

## 2018-12-04 PROCEDURE — 85027 COMPLETE CBC AUTOMATED: CPT

## 2018-12-04 PROCEDURE — 96367 TX/PROPH/DG ADDL SEQ IV INF: CPT

## 2018-12-04 PROCEDURE — 84145 PROCALCITONIN (PCT): CPT

## 2018-12-04 PROCEDURE — 71250 CT THORAX DX C-: CPT

## 2018-12-04 PROCEDURE — G0378: CPT

## 2018-12-04 PROCEDURE — 71046 X-RAY EXAM CHEST 2 VIEWS: CPT

## 2018-12-04 PROCEDURE — 80048 BASIC METABOLIC PNL TOTAL CA: CPT

## 2018-12-04 PROCEDURE — 87449 NOS EACH ORGANISM AG IA: CPT

## 2018-12-04 PROCEDURE — 96365 THER/PROPH/DIAG IV INF INIT: CPT

## 2018-12-04 PROCEDURE — 87581 M.PNEUMON DNA AMP PROBE: CPT

## 2018-12-04 PROCEDURE — 87798 DETECT AGENT NOS DNA AMP: CPT

## 2018-12-04 PROCEDURE — 99222 1ST HOSP IP/OBS MODERATE 55: CPT

## 2018-12-04 PROCEDURE — 80053 COMPREHEN METABOLIC PANEL: CPT

## 2018-12-04 PROCEDURE — 81025 URINE PREGNANCY TEST: CPT

## 2018-12-04 PROCEDURE — 87633 RESP VIRUS 12-25 TARGETS: CPT

## 2018-12-04 PROCEDURE — 87486 CHLMYD PNEUM DNA AMP PROBE: CPT

## 2018-12-04 PROCEDURE — 96361 HYDRATE IV INFUSION ADD-ON: CPT

## 2018-12-04 PROCEDURE — 87040 BLOOD CULTURE FOR BACTERIA: CPT

## 2018-12-04 RX ORDER — CEFTRIAXONE 500 MG/1
1 INJECTION, POWDER, FOR SOLUTION INTRAMUSCULAR; INTRAVENOUS EVERY 24 HOURS
Qty: 0 | Refills: 0 | Status: DISCONTINUED | OUTPATIENT
Start: 2018-12-04 | End: 2018-12-04

## 2018-12-04 RX ORDER — AZITHROMYCIN 500 MG/1
500 TABLET, FILM COATED ORAL EVERY 24 HOURS
Qty: 0 | Refills: 0 | Status: DISCONTINUED | OUTPATIENT
Start: 2018-12-04 | End: 2018-12-04

## 2018-12-04 RX ORDER — ASCORBIC ACID 60 MG
0 TABLET,CHEWABLE ORAL
Qty: 0 | Refills: 0 | COMMUNITY

## 2018-12-04 RX ORDER — CIPROFLOXACIN LACTATE 400MG/40ML
1 VIAL (ML) INTRAVENOUS
Qty: 0 | Refills: 0 | COMMUNITY
Start: 2018-12-04

## 2018-12-04 RX ORDER — ACETAMINOPHEN 500 MG
650 TABLET ORAL ONCE
Qty: 0 | Refills: 0 | Status: COMPLETED | OUTPATIENT
Start: 2018-12-04 | End: 2018-12-04

## 2018-12-04 RX ADMIN — AZITHROMYCIN 250 MILLIGRAM(S): 500 TABLET, FILM COATED ORAL at 12:36

## 2018-12-04 RX ADMIN — Medication 650 MILLIGRAM(S): at 13:00

## 2018-12-04 RX ADMIN — Medication 650 MILLIGRAM(S): at 12:37

## 2018-12-04 RX ADMIN — DEXTROSE MONOHYDRATE, SODIUM CHLORIDE, AND POTASSIUM CHLORIDE 125 MILLILITER(S): 50; .745; 4.5 INJECTION, SOLUTION INTRAVENOUS at 03:29

## 2018-12-04 NOTE — CONSULT NOTE ADULT - ASSESSMENT
34 y/o F with no significant PMH presents with chills, back pain x several days. Diagnosed with L sided PNA 3 weeks ago at Urgent Care, treated with ?Avelox. Now has RML dense infiltrate with scattered multifocal tree in bud opacities. No fever, leukocytosis procalcitonin negative. RVP negative.

## 2018-12-04 NOTE — PROGRESS NOTE ADULT - SUBJECTIVE AND OBJECTIVE BOX
CHIEF COMPLAINT:Patient is a 33y old  Female who presents with a chief complaint of pna (04 Dec 2018 09:18)    	        PAST MEDICAL & SURGICAL HISTORY:  No Past Medical History  S/P  Section: 3 years ago          REVIEW OF SYSTEMS:  CONSTITUTIONAL:feels much better  EYES: No eye pain, visual disturbances, or discharge  NECK: No pain or stiffness  RESPIRATORY: dec cough and sob  CARDIOVASCULAR: No chest pain, palpitations, passing out, dizziness, or leg swelling  GASTROINTESTINAL: No abdominal or epigastric pain. No nausea, vomiting, or hematemesis; No diarrhea or constipation. No melena or hematochezia.  GENITOURINARY: No dysuria, frequency, hematuria, or incontinence  NEUROLOGICAL: No headaches, memory loss, loss of strength, numbness, or tremors  SKIN: No itching, burning, rashes, or lesions   LYMPH Nodes: No enlarged glands  ENDOCRINE: No heat or cold intolerance; No hair loss  MUSCULOSKELETAL: No joint pain or swelling; No muscle, back, or extremity pain    Medications:  MEDICATIONS  (STANDING):  azithromycin  IVPB 500 milliGRAM(s) IV Intermittent every 24 hours  cefTRIAXone   IVPB 1 Gram(s) IV Intermittent every 24 hours  dextrose 5% + sodium chloride 0.9% with potassium chloride 40 mEq/L 1000 milliLiter(s) (125 mL/Hr) IV Continuous <Continuous>  heparin  Injectable 5000 Unit(s) SubCutaneous every 12 hours  influenza   Vaccine 0.5 milliLiter(s) IntraMuscular once    MEDICATIONS  (PRN):    	    PHYSICAL EXAM:  T(C): 36.7 (18 @ 11:05), Max: 36.9 (18 @ 14:40)  HR: 77 (18 @ 11:05) (69 - 97)  BP: 99/61 (18 @ 11:05) (90/56 - 101/60)  RR: 18 (18 @ 11:05) (18 - 20)  SpO2: 99% (18 @ 11:05) (97% - 100%)  Wt(kg): --  I&O's Summary      Appearance: Normal	  HEENT:   Normal oral mucosa, PERRL, EOMI	  Lymphatic: No lymphadenopathy  Cardiovascular: Normal S1 S2, No JVD, No murmurs, No edema  Respiratory: dec bs   Psychiatry: A & O x 3, Mood & affect appropriate  Gastrointestinal:  Soft, Non-tender, + BS	  Skin: No rashes, No ecchymoses, No cyanosis	  Neurologic: Non-focal  Extremities: Normal range of motion, No clubbing, cyanosis or edema  Vascular: Peripheral pulses palpable 2+ bilaterally    TELEMETRY: 	    ECG:  	  RADIOLOGY:  OTHER: 	  	  LABS:	 	    CARDIAC MARKERS:                                11.1   4.64  )-----------( 112      ( 04 Dec 2018 08:06 )             32.7     12-04    143  |  112<H>  |  8   ----------------------------<  103<H>  4.2   |  21<L>  |  0.54    Ca    7.8<L>      04 Dec 2018 05:25    TPro  7.0  /  Alb  4.0  /  TBili  0.4  /  DBili  x   /  AST  19  /  ALT  9<L>  /  AlkPhos  54  12-03    proBNP:   Lipid Profile:   HgA1c:   TSH:

## 2018-12-04 NOTE — DISCHARGE NOTE ADULT - MEDICATION SUMMARY - MEDICATIONS TO TAKE
I will START or STAY ON the medications listed below when I get home from the hospital:    Garlic oral tablet  -- Indication: For suupliment    Echinacea oral tablet  -- Indication: For suppliment    levoFLOXacin 750 mg oral tablet  -- 1 tab(s) by mouth every 24 hours  -- Indication: For PnA I will START or STAY ON the medications listed below when I get home from the hospital:    doxycycline hyclate 100 mg oral capsule  -- 1 cap(s) by mouth 2 times a day   -- Avoid prolonged or excessive exposure to direct and/or artificial sunlight while taking this medication.  Do not take this drug if you are pregnant.  Finish all this medication unless otherwise directed by prescriber.  Medication should be taken with plenty of water.    -- Indication: For PnA    Garlic oral tablet  -- Indication: For suppliment    Echinacea oral tablet  -- Indication: For suppliment

## 2018-12-04 NOTE — DISCHARGE NOTE ADULT - HOSPITAL COURSE
32 y/o F with no significant PMH presents with chills, back pain x several days. Diagnosed with L sided PNA 3 weeks ago at Urgent Care, treated with ?Avelox. Now has RML dense infiltrate with scattered multifocal tree in bud opacities. No fever, leukocytosis procalcitonin negative. RVP negative. Patient feeling better, back pain resolved. 98% on RA pt sent home with Levaquin 750 mg po x 45 days  ID DR bonds saw the pt abd recommended antibiotic  DR Vic jimenez also saw the pt. repeat ct chest in 4 weeks 32 y/o F with no significant PMH presents with chills, back pain x several days. Diagnosed with L sided PNA 3 weeks ago at Urgent Care, treated with ?Avelox. Now has RML dense infiltrate with scattered multifocal tree in bud opacities. No fever, leukocytosis procalcitonin negative. RVP negative. Patient feeling better, back pain resolved. 98% on RA pt sent home with Levaquin 750 mg po x 45 days  ID DR bonds saw the pt abd recommended antibiotic  DR Vic jimenez also saw the pt. repeat ct chest in 4 weeks   ct chest -    Right middle lobe consolidation likely pneumonia with   bilateral tree-in-bud opacities representing foci of impacted distal   airways or endobronchial spread of infection. A 1-3 month follow-up CT is   recommended to ensure resolution.    Considerations include mycobacterial avium complex infection given the   distribution.    2.2 cm splenic hypodense lesion with interval increase in size since   August 6, 2011. Contrast-enhanced MRI is recommended for further   evaluation.      pt need MRI of abd- she will have MRI as out pt and managed with PMD Dr Yael Burton   Repeat chest ct in 4 weeks 34 y/o F with no significant PMH presents with chills, back pain x several days. Diagnosed with L sided PNA 3 weeks ago at Urgent Care, treated with ?Avelox. Now has RML dense infiltrate with scattered multifocal tree in bud opacities. No fever, leukocytosis procalcitonin negative. RVP negative. Patient feeling better, back pain resolved. 98% on RA pt sent home with Doxycycline 100mg bid x 10days  ID DR bonds saw the pt abd recommended antibiotic above  DR Vic jimenez also saw the pt. repeat ct chest in 4 weeks   ct chest -    Right middle lobe consolidation likely pneumonia with   bilateral tree-in-bud opacities representing foci of impacted distal   airways or endobronchial spread of infection. A 1-3 month follow-up CT is   recommended to ensure resolution.    Considerations include mycobacterial avium complex infection given the   distribution.    2.2 cm splenic hypodense lesion with interval increase in size since   August 6, 2011. Contrast-enhanced MRI is recommended for further   evaluation.      pt need MRI of abd- she will have MRI as out pt and managed with PMD Dr Yael Burton   Repeat chest ct in 4 weeks

## 2018-12-04 NOTE — CONSULT NOTE ADULT - ASSESSMENT
33 yr old with history of left sided pna and recurrent symptoms after 3 weeks   CT scan suggests chronic changes and possible rt sided infiltrate  Pt alert not sob wants to go home and feels better since last night. It is unclear whether she has underlying chronic  disease with tree and bud appearance  I think it is reasonable to discharge on a course of levoquin 750 mg q day for 45 days and get a follow up film in 4 weeks

## 2018-12-04 NOTE — CONSULT NOTE ADULT - SUBJECTIVE AND OBJECTIVE BOX
Patient is a 33y old  Female who presents with a chief complaint of   HPI:  33y female w no sig PMhx p/w chills and dx of PNA.   Pt was seen at urgent care 3 wks ago for cough and chills, dx w/ left sided pna, and dced w/ PO penicillin.   Pt had clinical improvement until several days ago when she began experiencing chills again; she returned to urgent care today and was found to have new right sided pna, w/ resolution of her initial left sided pna.   She was then instructed to come to the ED for possible IV antibx. (03 Dec 2018 18:30)      PAST MEDICAL & SURGICAL HISTORY:  No Past Medical History  S/P  Section: 3 years ago      Social history:    FAMILY HISTORY:    REVIEW OF SYSTEMS  General:	Denies any malaise fatigue or chills. Fevers absent    Skin:No rash  	  Ophthalmologic:Denies any visual complaints,discharge redness or photophobia  	  ENMT:No nasal discharge,headache,sinus congestion or throat pain.No dental complaints    Respiratory and Thorax:No cough,sputum or chest pain.Denies shortness of breath  	  Cardiovascular:	No chest pain,palpitaions or dizziness    Gastrointestinal:	NO nausea,abdominal pain or diarrhea.    Genitourinary:	No dysuria,frequency. No flank pain    Musculoskeletal:	No joint swelling or pain.No weakness    Neurological:No confusion,diziness.No extremity weakness.No bladder or bowel incontinence	    Psychiatric:No delusions or hallucinations	    Hematology/Lymphatics:	No LN swelling.No gum bleeding     Endocrine:	No recent weight gain or loss.No abnormal heat/cold intolerance    Allergic/Immunologic:	No hives or rash   Allergies    &quot;laxatives&quot; (causes swelling) (Swelling)  No Known Drug Allergies  seasonal allergies (Unknown)    Intolerances        Antimicrobials:    azithromycin  IVPB 500 milliGRAM(s) IV Intermittent every 24 hours  cefTRIAXone   IVPB 1 Gram(s) IV Intermittent every 24 hours        Vital Signs Last 24 Hrs  T(C): 36.7 (04 Dec 2018 04:05), Max: 36.9 (03 Dec 2018 14:40)  T(F): 98 (04 Dec 2018 04:05), Max: 98.4 (03 Dec 2018 14:40)  HR: 78 (04 Dec 2018 04:05) (69 - 97)  BP: 95/66 (04 Dec 2018 04:05) (90/56 - 101/60)  BP(mean): --  RR: 18 (04 Dec 2018 04:05) (18 - 20)  SpO2: 100% (04 Dec 2018 04:05) (97% - 100%)    PHYSICAL EXAM:Pleasant patient in no acute distress.      Constitutional:Comfortable.Awake and alert  No cachexia     Eyes:PERRL EOMI.NO discharge or conjunctival injection    ENMT:No sinus tenderness.No thrush.No pharyngeal exudate or erythema.Fair dental hygiene    Neck:Supple,No LN,no JVD      Respiratory:Good air entry bilaterally,CTA    Cardiovascular:S1 S2 wnl, No murmurs,rub or gallops    Gastrointestinal:Soft BS(+) no tenderness no masses ,No rebound or guarding    Genitourinary:No CVA tendereness     Rectal:    Extremities:No cyanosis,clubbing or edema.    Vascular:peripheral pulses felt    Neurological:AAO X 3,No grossly focal deficits    Skin:No rash     Lymph Nodes:No palpable LNs    Musculoskeletal:No joint swelling or LOM    Psychiatric:Affect normal.                                11.1   4.64  )-----------( 112      ( 04 Dec 2018 08:06 )             32.7         12-04    143  |  112<H>  |  8   ----------------------------<  103<H>  4.2   |  21<L>  |  0.54    Ca    7.8<L>      04 Dec 2018 05:25    TPro  7.0  /  Alb  4.0  /  TBili  0.4  /  DBili  x   /  AST  19  /  ALT  9<L>  /  AlkPhos  54  12-03      RECENT CULTURES:      MICROBIOLOGY:          Radiology:      Assessment:        Recommendations and Plan:    Pager [phone]  After 5 pm/weekends or if no response :[phone]

## 2018-12-04 NOTE — CONSULT NOTE ADULT - ATTENDING COMMENTS
Pt with RML pneumonia, ?partially treated. Took 7d of avelox finished ?2wks ago.Had chills 72h ago. Had sick contact w her child 3 wks ago. Non toxic, no sob, no fever or chills X48h,normal sats, no chest pains.  Will dc on doxycycline 100mg po bid for 7d. FU in office in 2 weeks. Any change in symptoms pt to call.  D/W pt and id md.

## 2018-12-04 NOTE — DISCHARGE NOTE ADULT - PATIENT PORTAL LINK FT
You can access the [facility] Patient Portal, offered by [facility], by registering with the following website: [email or web address]

## 2018-12-04 NOTE — PROGRESS NOTE ADULT - ASSESSMENT
pt w/ pna  pulm eval noted  id eval noted  change to po abs  hypotension  iv fluids  dvt proph  ct chest noted w/ changes  need close pulm f/u as oupt   also need mr spleen as outpt for lesion  discussed w/ pt

## 2018-12-04 NOTE — DISCHARGE NOTE ADULT - PROVIDER TOKENS
JEFERSON:'[id number]' TOKEN:'[id number]',FREE:[LAST:[Josephine],FIRST:[bree],PHONE:[(   )    -],FAX:[(   )    -]]

## 2018-12-04 NOTE — CONSULT NOTE ADULT - PROBLEM SELECTOR RECOMMENDATION 9
RML opacity and scattered tree in bud opacities   -Atypical presentation without leukocytosis, fever, negative procalcitonin and RVP negative  -Feeling better on IV abx, plan to switch to Levaquin per ID recs   -f/u urine legionella, attempt to obtain sputum culture  -Patient needs outpt repeat CT in 4 weeks to ensure resolution   -d/c planning

## 2018-12-04 NOTE — CONSULT NOTE ADULT - SUBJECTIVE AND OBJECTIVE BOX
PULMONARY CONSULT    HPI: 32 y/o F with no significant PMH presents with chills, back pain x several days. Diagnosed with L sided PNA 3 weeks ago at Urgent Care, treated with ?Avelox. Now has RML dense infiltrate with scattered multifocal tree in bud opacities. No fever, leukocytosis procalcitonin negative. RVP negative. Patient feeling better, back pain resolved. 98% on RA    PAST MEDICAL & SURGICAL HISTORY:  No Past Medical History  S/P  Section: 3 years ago    Allergies    &quot;laxatives&quot; (causes swelling) (Swelling)  No Known Drug Allergies  seasonal allergies (Unknown)    Intolerances      FAMILY HISTORY: Non-contributory     Social history: Never smoker    Review of Systems:  CONSTITUTIONAL: No fever, chills, or fatigue  EYES: No eye pain, visual disturbances, or discharge  ENMT:  No difficulty hearing, tinnitus, vertigo; No sinus or throat pain  NECK: No pain or stiffness  RESPIRATORY: Per above  CARDIOVASCULAR: No chest pain, palpitations, dizziness, or leg swelling  GASTROINTESTINAL: No abdominal or epigastric pain. No nausea, vomiting, or hematemesis; No diarrhea or constipation. No melena or hematochezia.  GENITOURINARY: No dysuria, frequency, hematuria, or incontinence  NEUROLOGICAL: No headaches, memory loss, loss of strength, numbness, or tremors  SKIN: No itching, burning, rashes, or lesions   MUSCULOSKELETAL: No joint pain or swelling; No muscle, back, or extremity pain  PSYCHIATRIC: No depression, anxiety, mood swings, or difficulty sleeping      Medications:  MEDICATIONS  (STANDING):  azithromycin  IVPB 500 milliGRAM(s) IV Intermittent every 24 hours  cefTRIAXone   IVPB 1 Gram(s) IV Intermittent every 24 hours  dextrose 5% + sodium chloride 0.9% with potassium chloride 40 mEq/L 1000 milliLiter(s) (125 mL/Hr) IV Continuous <Continuous>  heparin  Injectable 5000 Unit(s) SubCutaneous every 12 hours  influenza   Vaccine 0.5 milliLiter(s) IntraMuscular once    MEDICATIONS  (PRN):            Vital Signs Last 24 Hrs  T(C): 36.7 (04 Dec 2018 11:05), Max: 36.9 (03 Dec 2018 14:40)  T(F): 98.1 (04 Dec 2018 11:05), Max: 98.4 (03 Dec 2018 14:40)  HR: 77 (04 Dec 2018 11:05) (69 - 97)  BP: 99/61 (04 Dec 2018 11:05) (90/56 - 101/60)  BP(mean): --  RR: 18 (04 Dec 2018 11:05) (18 - 20)  SpO2: 99% (04 Dec 2018 11:05) (97% - 100%) on RA                LABS:                        11.1   4.64  )-----------( 112      ( 04 Dec 2018 08:06 )             32.7     12    143  |  112<H>  |  8   ----------------------------<  103<H>  4.2   |  21<L>  |  0.54    Ca    7.8<L>      04 Dec 2018 05:25    TPro  7.0  /  Alb  4.0  /  TBili  0.4  /  DBili  x   /  AST  19  /  ALT  9<L>  /  AlkPhos  54  12-          CAPILLARY BLOOD GLUCOSE            Procalcitonin, Serum: 0.07 ng/mL (18 @ 05:25)                CULTURES: (if applicable)  RVP: Negative      Physical Examination:    General: No acute distress.      HEENT: Pupils equal, reactive to light.  Symmetric.    PULM: Clear to auscultation bilaterally, no significant sputum production    CVS: S1, S2    ABD: Soft, nondistended, nontender, normoactive bowel sounds, no masses    EXT: No edema, nontender    SKIN: Warm and well perfused, no rashes noted.    NEURO: Alert, oriented, interactive, nonfocal    RADIOLOGY REVIEWED  CXR:    CT chest:    TTE:

## 2018-12-04 NOTE — DISCHARGE NOTE ADULT - CARE PLAN
Principal Discharge DX:	CAP (community acquired pneumonia)  Goal:	ct levaquine 750 mg x  45 days  Assessment and plan of treatment:	f/u DR BARBARA Ho

## 2018-12-04 NOTE — DISCHARGE NOTE ADULT - MEDICATION SUMMARY - MEDICATIONS TO STOP TAKING
I will STOP taking the medications listed below when I get home from the hospital:  None I will STOP taking the medications listed below when I get home from the hospital:    Levaquin 750 mg oral tablet  -- 1 tab(s) by mouth every 24 hours   -- Avoid prolonged or excessive exposure to direct and/or artificial sunlight while taking this medication.  Do not take dairy products, antacids, or iron preparations within one hour of this medication.  Finish all this medication unless otherwise directed by prescriber.  May cause drowsiness or dizziness.  Medication should be taken with plenty of water.

## 2018-12-08 LAB
CULTURE RESULTS: SIGNIFICANT CHANGE UP
CULTURE RESULTS: SIGNIFICANT CHANGE UP
SPECIMEN SOURCE: SIGNIFICANT CHANGE UP
SPECIMEN SOURCE: SIGNIFICANT CHANGE UP

## 2018-12-21 ENCOUNTER — APPOINTMENT (OUTPATIENT)
Dept: OBGYN | Facility: CLINIC | Age: 33
End: 2018-12-21
Payer: COMMERCIAL

## 2018-12-21 VITALS — WEIGHT: 131.38 LBS | DIASTOLIC BLOOD PRESSURE: 61 MMHG | HEART RATE: 80 BPM | SYSTOLIC BLOOD PRESSURE: 96 MMHG

## 2018-12-21 PROCEDURE — 76857 US EXAM PELVIC LIMITED: CPT

## 2018-12-21 PROCEDURE — 99395 PREV VISIT EST AGE 18-39: CPT | Mod: 25

## 2018-12-24 LAB — HPV HIGH+LOW RISK DNA PNL CVX: NOT DETECTED

## 2019-01-02 ENCOUNTER — RESULT REVIEW (OUTPATIENT)
Age: 34
End: 2019-01-02

## 2019-01-02 LAB — CYTOLOGY CVX/VAG DOC THIN PREP: NORMAL

## 2019-01-11 ENCOUNTER — OUTPATIENT (OUTPATIENT)
Dept: OUTPATIENT SERVICES | Facility: HOSPITAL | Age: 34
LOS: 1 days | End: 2019-01-11
Payer: COMMERCIAL

## 2019-01-11 ENCOUNTER — APPOINTMENT (OUTPATIENT)
Dept: ULTRASOUND IMAGING | Facility: CLINIC | Age: 34
End: 2019-01-11
Payer: COMMERCIAL

## 2019-01-11 DIAGNOSIS — K80.20 CALCULUS OF GALLBLADDER WITHOUT CHOLECYSTITIS WITHOUT OBSTRUCTION: ICD-10-CM

## 2019-01-11 PROCEDURE — 76700 US EXAM ABDOM COMPLETE: CPT

## 2019-01-11 PROCEDURE — 76700 US EXAM ABDOM COMPLETE: CPT | Mod: 26

## 2019-01-14 ENCOUNTER — APPOINTMENT (OUTPATIENT)
Dept: MRI IMAGING | Facility: IMAGING CENTER | Age: 34
End: 2019-01-14
Payer: COMMERCIAL

## 2019-01-14 ENCOUNTER — OUTPATIENT (OUTPATIENT)
Dept: OUTPATIENT SERVICES | Facility: HOSPITAL | Age: 34
LOS: 1 days | End: 2019-01-14
Payer: COMMERCIAL

## 2019-01-14 DIAGNOSIS — Z00.8 ENCOUNTER FOR OTHER GENERAL EXAMINATION: ICD-10-CM

## 2019-01-14 PROCEDURE — 74183 MRI ABD W/O CNTR FLWD CNTR: CPT | Mod: 26

## 2019-01-14 PROCEDURE — A9585: CPT

## 2019-01-14 PROCEDURE — 74183 MRI ABD W/O CNTR FLWD CNTR: CPT

## 2019-01-15 ENCOUNTER — TRANSCRIPTION ENCOUNTER (OUTPATIENT)
Age: 34
End: 2019-01-15

## 2019-02-04 ENCOUNTER — APPOINTMENT (OUTPATIENT)
Dept: RADIOLOGY | Facility: CLINIC | Age: 34
End: 2019-02-04
Payer: COMMERCIAL

## 2019-02-04 ENCOUNTER — OUTPATIENT (OUTPATIENT)
Dept: OUTPATIENT SERVICES | Facility: HOSPITAL | Age: 34
LOS: 1 days | End: 2019-02-04
Payer: COMMERCIAL

## 2019-02-04 DIAGNOSIS — Z00.8 ENCOUNTER FOR OTHER GENERAL EXAMINATION: ICD-10-CM

## 2019-02-04 PROCEDURE — 71046 X-RAY EXAM CHEST 2 VIEWS: CPT

## 2019-02-04 PROCEDURE — 71046 X-RAY EXAM CHEST 2 VIEWS: CPT | Mod: 26

## 2019-03-06 ENCOUNTER — OUTPATIENT (OUTPATIENT)
Dept: OUTPATIENT SERVICES | Facility: HOSPITAL | Age: 34
LOS: 1 days | End: 2019-03-06
Payer: COMMERCIAL

## 2019-03-06 ENCOUNTER — APPOINTMENT (OUTPATIENT)
Dept: RADIOLOGY | Facility: CLINIC | Age: 34
End: 2019-03-06
Payer: COMMERCIAL

## 2019-03-06 DIAGNOSIS — Z00.8 ENCOUNTER FOR OTHER GENERAL EXAMINATION: ICD-10-CM

## 2019-03-06 PROCEDURE — 71046 X-RAY EXAM CHEST 2 VIEWS: CPT

## 2019-03-06 PROCEDURE — 71046 X-RAY EXAM CHEST 2 VIEWS: CPT | Mod: 26

## 2019-03-07 ENCOUNTER — RESULT REVIEW (OUTPATIENT)
Age: 34
End: 2019-03-07

## 2019-11-10 ENCOUNTER — TRANSCRIPTION ENCOUNTER (OUTPATIENT)
Age: 34
End: 2019-11-10

## 2019-12-12 NOTE — PATIENT PROFILE ADULT - FUNCTIONAL SCREEN CURRENT LEVEL: SWALLOWING (IF SCORE 2 OR MORE FOR ANY ITEM, CONSULT REHAB SERVICES), MLM)
Pt will perform all bed mobility in 2 weeks independently
0 = swallows foods/liquids without difficulty

## 2020-10-21 ENCOUNTER — APPOINTMENT (OUTPATIENT)
Dept: OBGYN | Facility: CLINIC | Age: 35
End: 2020-10-21
Payer: COMMERCIAL

## 2020-11-04 ENCOUNTER — TRANSCRIPTION ENCOUNTER (OUTPATIENT)
Age: 35
End: 2020-11-04

## 2020-11-04 ENCOUNTER — APPOINTMENT (OUTPATIENT)
Dept: OBGYN | Facility: CLINIC | Age: 35
End: 2020-11-04
Payer: COMMERCIAL

## 2020-11-04 VITALS
HEIGHT: 65 IN | SYSTOLIC BLOOD PRESSURE: 100 MMHG | BODY MASS INDEX: 21.66 KG/M2 | WEIGHT: 130 LBS | DIASTOLIC BLOOD PRESSURE: 60 MMHG

## 2020-11-04 DIAGNOSIS — N63.13 UNSPECIFIED LUMP IN THE RIGHT BREAST, LOWER OUTER QUADRANT: ICD-10-CM

## 2020-11-04 DIAGNOSIS — Z78.9 OTHER SPECIFIED HEALTH STATUS: ICD-10-CM

## 2020-11-04 DIAGNOSIS — N92.6 IRREGULAR MENSTRUATION, UNSPECIFIED: ICD-10-CM

## 2020-11-04 DIAGNOSIS — Z01.419 ENCOUNTER FOR GYNECOLOGICAL EXAMINATION (GENERAL) (ROUTINE) W/OUT ABNORMAL FINDINGS: ICD-10-CM

## 2020-11-04 PROCEDURE — 99072 ADDL SUPL MATRL&STAF TM PHE: CPT

## 2020-11-04 PROCEDURE — 99395 PREV VISIT EST AGE 18-39: CPT

## 2020-11-25 LAB
FSH SERPL-MCNC: 3.2 IU/L
PROLACTIN SERPL-MCNC: 6.2 NG/ML
TSH SERPL-ACNC: 2.55 UIU/ML

## 2020-12-07 ENCOUNTER — NON-APPOINTMENT (OUTPATIENT)
Age: 35
End: 2020-12-07

## 2020-12-23 NOTE — PATIENT PROFILE ADULT - NSPROPOAURINARYCATHETER_GEN_A_NUR
Preparing for Your Surgery      Name:  Jennifer Ferrera   MRN:  [id number]   :  1933   Today's Date:  2020       Arriving for surgery:  Surgery date:  2020  Arrival time:  6AM    Restrictions due to COVID 19:  No visitors are allowed at this time.    [employer] parking is available for anyone with mobility limitations or disabilities.  ([place]  24 hours/ 7 days a week; [facility] [place]  7 am- 3:30 pm, Mon- Fri)    Please come to:       [facility], [place] [address]  [address] [place]  [address]     -    Please proceed to the Surgery Lounge on the 3rd floor. [phone]?     - ?If you are in need of directions, wheelchair or escort please stop at the Information Desk in the lobby.  Inform the information person that you are here for surgery; a wheelchair and escort will be provided to the Surgery Lounge .?     What can I eat or drink?  -  You may eat and drink normally for up to 8 hours before your surgery. (Until Midnight)  -  You may have clear liquids until 2 hours before surgery. (Until 2020, 6AM)    Examples of clear liquids:  Water  Clear broth  Juices (apple, white grape, white cranberry  and cider) without pulp  Noncarbonated, powder based beverages  (lemonade and Jose D-Aid)  Sodas (Sprite, 7-Up, ginger ale and seltzer)  Coffee or tea (without milk or cream)  Gatorade    -  No Alcohol for at least 24 hours before surgery     Which medicines can I take?    Hold Aspirin for 7 days before surgery.   Hold Multivitamins for 7 days before surgery.  Hold Supplements for 7 days before surgery.  Hold Ibuprofen (Advil, Motrin) for 1 day before surgery--unless otherwise directed by surgeon.  Hold Naproxen (Aleve) for 4 days before surgery.    -  DO NOT take these medications the day of surgery:    Furosemide(Lasix)   Occuvite Preservision    Probiotic    -  PLEASE TAKE these medications the day of surgery:    Refresh Eye  drops   Escitalopram(Lexapro)    Levothyroxine    Pravastatin(Pravachol)    How do I prepare myself?  - Please take 2 showers before surgery using Scrubcare or Hibiclens soap.    Use this soap only from the neck to your toes.     Leave the soap on your skin for one minute--then rinse thoroughly.      You may use your own shampoo and conditioner; no other hair products.   - Please remove all jewelry and body piercings.  - No lotions, deodorants or fragrance.  - No makeup or fingernail polish.   - Bring your ID and insurance card.    - All patients are required to have a Covid-19 test within 4 days of surgery/procedure.      -Patients will be contacted by the [facility] scheduling team within 1 week of surgery to make an appointment.      - Patients may call the Scheduling team at [phone] if they have not been scheduled within 4 days of  surgery.      ALL PATIENTS GOING HOME THE SAME DAY OF SURGERY ARE REQUIRED TO HAVE A RESPONSIBLE ADULT TO DRIVE AND BE IN ATTENDANCE WITH THEM FOR 24 HOURS FOLLOWING SURGERY     Questions or Concerns:    - For any questions regarding the day of surgery or your hospital stay, please contact the Pre Admission Nursing Office at [phone].       - If you have health changes between today and your surgery please call your surgeon.       For questions after surgery please call your surgeons office.              no

## 2021-04-04 NOTE — H&P ADULT - CARDIOVASCULAR
Internal Medicine Progress Note      Chief Complaint:  S/p left medical assist device.  Congestive heart failure    46-year-old female with history of HIV on Haart therapy, CHF with EF around 20 % secondary to nonischemic cardiomyopathy, COPD/asthma, anemia, hx of CVA, also hx of PE in Nov 2019. Pt had prior admissions with CHF exacerbations. In Jan 2020 she was admitted for elective RHC, [place] monitor, found with elevated pressures, low CO/CI. Diuresed, weaned from milrinone, she also had single chamber ICD implanted.   Pt readmitted with cardiac decompensation, lower EF, had RHC march 3, now on inotrope, iv diuresis per Card, being evaluated for LVAD.        DAILY EVENTS:    3/3/21: RHC: Severely elevated RA/RV/PA/PCW pressures, severely reduced CO/CI, Mixed venous 38%  3/4/2021: S/p right heart catheterization and [place] placement.  Started on Lasix drip.  Lasix drip with 40 mg/h. F/U labs  3/5/2021: Decrease Lasix drip to 10 mg/h.  Albion out today.  Mobilize.  Offered all help and support to her.  Infectious disease consultation requested to manage her HAART medication as some of them are not available over here.Not orthoptic heart transplant candidate due to morbid obesity and HIV.  Plan to place PICC line, discontinue [place].  Keep inotrope for now.  Change Lasix to 10 mg/h.  Titrate milrinone.  Currently at 0.375 mcg/kg/min. 3/6/21:  On mil 0.375 and lasix gtt.  cc/hr. Declining VAD testing, discussion with cardiology  3/7/2021: Transferred to 9 W. c/o of itching at the [place] site.  We will give topical hydrocortisone for 3 days.  Currently on Lasix drip at 10 mg/h.  Milrinone 0.375 mcg/g/min.  Also on heparin drip.  LVAD work-up.  Patient with difficulty coping, help and support has been offered.   3/8/21:  Stable, BP on the low side, asymptomatic. On milrinone, Lasix drip, other GDMT on hold. AC with Hep drip. Cr stable, lytes replaced. Additional testing for LVAD work-up reviewed. CT scans overall  unremarkable, fibroids. US carotids OK. Additional consults noted. Thigh biopsy pending. D/w pt and mother POW at bedside.   3/9/21:  Alert, feels better. BP controled. Milrinone, transitioned to oral diuresis. Cr and lytes stable. AC with Hep drip for now. Completing LVAD work-up. Skin biopsy also pending. F/U testing.   3/10:  Stable, RA, overall better. Milrinone, Hep drip. Plan to resume coumadin. Cr/lytes stable. Diuresis, GDMT per Card. Planning home milrinone. Complete LVAD work-up.   3/11:  Doing well. RA. BP low occasional, asymptomatic. Milrinone, oral diuresis, GDMT. Cr slight up, lytes stable. C/o R foot pain/swelling. X-ray unremarkable, elevated uric acid, suspected gout. Colchicine low dose, allopurinol. Can only do Colchicine every 2-3 days per pharmacy, d/t interaction with Prezista. Will use steroids if needed. Hep drip, coumadin not resumed, plan for colonoscopy.   3/12:  Alert, RA, c/o R foot pain. Cannot use colchicine, plan short steroid course, Allopurinol. Continue Milrinone, oral diuresis attenuated, Cr is slight up. Plans for colonoscopy per GI. AC with Hep drip. Labs reviewed. Completing LVAD work-up.   3/13:  Stable, RA. Foot pain better, plan to wear boot per Podiatry. Cr still up, labs reviewed. Milrinone, diuresis attenuated. Seen by GI, plan for colonoscopy Mon. F/U labs.   3/14:  Alert, RA, on Clears and will get prep for colonoscopy tomorrow. Cr still elevated, torsemide on hold. Milrinone, Hep drip. Labs reviewed. Gout/foot pain better. Pt prefers to go home on milrinone and return for possible LVAD surgery if possible. F/U labs.   3/15/2021: Colonoscopy today.  3/16/2021: Right heart catheterization today.  Possible transfer to ICU if need [place] in place.  3/17/2021:RHC yesterday. CVP 14. PA 53/28. CO 3.2. CI 1.7. Good UOP. Currently on lasix gtt 20mg/hr, milrinone 0.5 mcg/kg/min, and heparin gtt.  3/18/21: Cr down to 1.47. BUN 31. Hgb stable. proBNP down to 2.6k. 3/19/2021:CVP 13.  MVO2 68%. Roger Index 2.6.  Net negative 2.2L.  Actively titrating Lasix milrinone  3/19/2021:CVP 13. MVO2 68%. Roger Index 2.6.  Net negative 2.2L.  Actively titrating Lasix milrinone  3/20/2021: No new complaints.  Feels about the same.  Complains of discomfort at the [place] site.  Off dobutamine and Lasix drip.  Milrinone being actively titrated.  Sodium 127.  Repeat CMP in the morning.  On heparin, monitor PTT and adjust heparin drip.  3/21/2021: Complains of gout in the left big toe area.  Unable to give colchicine because of interaction with HIV meds.  Will treat with prednisone for 3 days and see symptomatic response.  If no improvement might need rheumatology to come and see her.  3/22: Alert, c/o mild HA, some gout pain in her foot.  Milrinone, Hep drip. Hemo's review. Off Isosorbide, tolerates hydralazine better. Diuresis with iv Lasix. HypoNa, tolvaptan dose. Labs reviewed. Plan for LVAD surgery, possible tomorrow. D/w RN  3/23:  Alert, O2NC. No HA. BP stable. Milrinone, back on Lasix drip, CVP was elevated, CXR with congestion. Cr/lytes stable. HypoNa better. AC with Hep drip. Planning OR tomorrow, LVAD surgery. D/w RN  3/24:  Alert, c/o fatigue, gout pain in her foot. Milrinone, iv diuresis. Hep drip. C/o mild HA after hydralazine doses. Awaiting LVAD surgery. Cr overall stable, lytes replaced. For gout, avoid steroids at this time, colchicine dose, will increase allopurinol after her surgery. F/U labs. D/w RN  3/25:  Alert, pain controled. BP stable. Milrinone, iv diuresis. [place] monitor. AC with Hep drip. Cr close to baseline, HypoNa stable. Plan for LVAD surgery tomorrow. D/w RN  3/26:  In OR for HM3, LVAD implant. Postop care in [place].   3/27:  Extubated, O2NC, responsive. Dual inotropes, Epi, Lasix drip. CVP mild elevated. Mild tachy, ICD back. Device stable flows. Good urine output. Labs reviewed. Cr, Hg stable. Postop care. Coumadin dose planned. D/w RN  3/28:  Alert, O2NC, appetite fair. Some issues  with pain. CVP high, diuresis, Lasix drip, diuril. Weaning Epi. Dual inotropes. Device stable flows. Cr stable, mild postop anemia. F/U labs, OOB in chair. D/w RN  3/29/2021:MAPs 70s-80s. CVP 18, PA 30s/20s, CO 5.6, CI 2.8, . MVO2 68%. Net negative 260 cc/24hrs. Cr 1.38. BUN 25. Na 129. WBC 10.1. Hgb 8.5. INR 1.0. Dobutamine 5  mcg/kg/min, Epi 2 mcg/min, Lasix 40 mg/hr, Milrinone 0.5 mcg/kg/min.  3/30/21: MAPs 70s-80s. CVP 14, PA 27/20, CO 5.4, CI 2.6, . Net negative 3.8 L/24hrs. Na 130. Cr 1.12. BUN 24. . WBC 8.4. Hgb 8.4. Plt 124. INR 1.0. Dobutamine 5 mcg/kg/min, Epi 2 mcg/min, Lasix 40 mg/hr, Milrinone 0.5 mcg/kg/min.  3/31/2021: Titrating Lasix drip.  We will decrease to 20 mg/h today.  Remains on epinephrine at 0.5 mcg/min.  We will try to titrate.  Current doses of milrinone is 0.5 mcg/kg/min.  Also on dobutamine 5 mcg/g/min.  Inhaled nitrous oxide at 30 ppm.  Remains in adult surgical heart unit.  Requiring active titration of inhalers oxide, inotropes, pressors  4/1/21: -120s. MAPs 80s. CVP 15, CO 5.5, CI 2.7. Failed lower dose of Lasix.  On  4 mcg/kg/min, Lasix gtt 30 mg/hr, and Milrinone 0.5 mcg/kg/min. Net negative 2.1L.  ml/hr. Off Epi. Maricruz decreased to 20 ppm.   4/2/21: Na 133. Cr 0.88. MAPs 90s. Started on cardene. CVP 10-14. PA 39/21. CO 5.8. CI 2.9. Maricruz 20ppm. HM3 speed 5500RPM, flowing 4.5 lpm. Remains on  4 mcg/kg/min, milrinone 0.5 mcg/kg/min, and lasix 40mg/hr. no further pain in the feet.  Not having active gout.  No need for steroid of colchicine at this point.  4/3/2021: Complains of intermittent headaches.  No new focal neurological deficit.  No visual changes.  Cardiology aware and wants to hold onto's CAT scan of the head at this point.  Na 132. Cr .08. CVP 10-15. Afebrile. Maricruz 10ppm. Currently on dual inotropes, lasix 40mg/hr, cardene. INR 1.9.  4/4/21: Afebrile. MAPs 80s. CVP 5. Lasix dropped to 30 mg/hr yesterday. On  2 mcg/kg/min and Milrinone  0.5 mcg/kg/min.  INR 2.5. Na 131. Cr 0.88. .  Started on prednisone 40 mg daily for 3 days by cardiology for acute gout.      Hospital Meds  Scheduled Medications  • [START ON 3/13/2021] predniSONE  10 mg Oral Daily with breakfast   • torsemide  60 mg Oral BID   • allopurinol  100 mg Oral Daily   • potassium CHLORIDE  20 mEq Oral BID WC   • metoPROLOL succinate  25 mg Oral QHS   • spironolactone  25 mg Oral Daily   • Potassium Standard Replacement Protocol   Does not apply See Admin Instructions   • sodium chloride (PF)  10 mL Injection 2 times per day   • cholecalciferol  50 mcg Oral Daily   • aspirin  81 mg Oral Daily   • darunavir  600 mg Oral BID   • emtricitabine-tenofovir DISOPROXIL  1 tablet Oral Daily   • ferrous sulfate  325 mg Oral Daily with dinner   • montelukast  10 mg Oral Nightly   • pantoprazole  40 mg Oral Nightly   • raltegravir  400 mg Oral BID   • ritonavir  100 mg Oral BID WC       PRN Medications  oxyCODONE-acetaminophen, bacitracin-polymyxin B, sodium chloride, sodium chloride (PF), sodium chloride (PF), HYDROcodone-acetaminophen, heparin (porcine), heparin (porcine), calcium carbonate, zolpidem, acetaminophen, ondansetron, albuterol, diphenhydrAMINE, fluticasone, sodium chloride, guaiFENesin-codeine    Last Recorded Vitals  Visit Vitals  /82 (BP Location: LLE - Left lower extremity, Patient Position: Semi-Velasco's)   Pulse (!) 102   Temp 98.1 °F (36.7 °C) (Oral)   Resp 17   Ht 5' 4\" (1.626 m)   Wt 103.4 kg (227 lb 15.3 oz)   SpO2 96%   BMI 39.13 kg/m²         SpO2 Readings from Last 3 Encounters:   03/12/21 96%   03/02/21 99%   02/26/21 99%        Physical Exam  Constitutional:       Appearance: Normal appearance.   HENT:      Mouth/Throat:      Mouth: Mucous membranes are moist.   Eyes:      Extraocular Movements: Extraocular movements intact.   Cardiovascular:      Rate and Rhythm: Normal rate and regular rhythm.   Pulmonary:      Breath sounds: Normal breath sounds.    Abdominal:      General: Abdomen is flat.      Palpations: Abdomen is soft.   Musculoskeletal:      Cervical back: Neck supple.   Skin:     General: Skin is warm and dry.   Neurological:      General: No focal deficit present.      Mental Status: She is alert and oriented to person, place, and time.         Labs   Recent Labs     03/10/21  0506 03/11/21  0442 03/12/21  0514   WBC 4.7 4.6 4.9   HGB 11.3* 11.4* 11.3*   HCT 36.2 36.8 35.9*    201 184   MCV 77.2* 78.0 77.7*       Recent Labs   Lab 03/12/21  0514 03/11/21  0442 03/10/21  0506   SODIUM 127* 128* 128*   POTASSIUM 3.9 4.8 4.1   CHLORIDE 92* 97* 95*   CO2 27 24 27   BUN 31* 27* 22*   CREATININE 1.77* 1.45* 1.28*   GLUCOSE 110* 124* 149*   CALCIUM 9.4 9.3 9.2       No results found     Recent Labs   Lab 03/12/21  0514 03/11/21  0442 03/10/21  0506 03/09/21  0448 03/08/21  0557   AST 33 34 39* 36 28       Recent Labs     03/10/21  0506 03/11/21  0442  03/11/21  1228 03/11/21  1818 03/12/21  0514   INR 1.1 1.1  --   --   --  1.1   PT 11.5 11.9*  --   --   --  11.9*   PTT 51* 42*   < > 62* 54* 47*    < > = values in this interval not displayed.       No results available in last 24 hours      Microbiology Results     None            Final Result   1.   Mild generalized soft tissue swelling.  No soft tissue gas or foreign   bodies.   2.   No acute osseous changes.      Electronically Signed by: OLY MISHRA MD    Signed on: 3/11/2021 3:39 PM          CT CHEST ABDOMEN PELVIS WO CONTRAST   Final Result       Cardiomegaly.  No findings of interstitial edema.        Enlarged fibroid uterus.      Electronically Signed by: ELIZABETH GARCIA MD    Signed on: 3/8/2021 2:51 PM          CT HEAD WO CONTRAST   Final Result      1. No acute intracranial process.      Electronically Signed by: AGUSTÍN MAURO MD    Signed on: 3/8/2021 1:35 PM          XR ORTHOPANTOGRAM   Final Result      The patient is missing multiple teeth.  Otherwise unremarkable study.         Electronically Signed by: KRUPA CORTES    Signed on: 3/8/2021 12:26 PM          US VASC CAROTID DUPLEX BILATERAL   Final Result      No evidence for hemodynamically significant internal carotid stenosis on   either side.        Incidental note is made of thrombus in the right internal jugular vein.        Findings above were relayed to patients nurse, Anne, by the sonographer      Electronically Signed by: ELIZABETH GARCIA MD    Signed on: 3/7/2021 10:04 PM          US Ankle/Brachial 1 or 2 Level Extremity   Final Result   Preserved arterial flow with normal ankle-brachial index bilaterally.      Reference for JOAQUIN:   Normal: Greater than or equal to 1.0    Borderline: 0.91-0.99    Mild disease: 0.81-0.90    Moderate disease: 0.51-.80    Moderate to severe disease: 0.31-0.50    Severe disease: Less than or equal to 0.30    Calcified/noncompressible arteries: greater than 1.4        TOE PRESSURES/TBI LEVELS (interpretation):    Toe systolic pressure greater than 30 mmHg may indicate healing potential   of foot ulcers.    The normal range for TBI is > 0.65    If TBI is < 0.65, there is evidence of arterial occlusive disease.       Electronically Signed by: ELIZABETH GARCIA MD    Signed on: 3/7/2021 10:26 PM          US VASC EXTREMITY LOWER VENOUS DUPLEX   Final Result      1.   No evidence of bilateral lower extremity DVT.      Electronically Signed by: SILVA ALCAZAR MD    Signed on: 3/7/2021 8:22 PM          US ABDOMEN COMPLETE   Final Result   1. Hepatic steatosis.      Limited visualization of the distal pancreas and distal abdominal   aorta/bifurcation.      Status post cholecystectomy.      Thank you for the opportunity to participate in the care of your patient.   Please contact us if we can be of further assistance.         Electronically Signed by: AUBREY GARCIA DO    Signed on: 3/7/2021 10:35 AM          XR ABDOMEN 1 VIEW   Final Result   No evidence of an acute intraabdominal process.  Moderate   fecal  stasis.      Electronically Signed by: AUBREY GARCIA DO    Signed on: 3/7/2021 10:12 AM          XR CHEST AP OR PA 1 VIEW   Final Result   Right PICC line tip projects over the atriocaval junction.      Electronically Signed by: HIEU TILLEY MD    Signed on: 3/6/2021 1:25 PM          XR CHEST PA OR AP 1 VIEW   Final Result   Mild central congestion.  Perihilar interstitial opacities.  Left lung base   opacity.  Small left pleural effusion.  Tiny right pleural effusion.        Electronically Signed by: HIEU TILLEY MD    Signed on: 3/6/2021 10:18 AM          XR CHEST AP OR PA 1 VIEW   Final Result   Stable support devices.  No significant change from prior exam.            Electronically Signed by: ERIBERTO LAMB MD    Signed on: 3/5/2021 9:37 AM          XR CHEST AP OR PA 1 VIEW   Final Result   Impression:     Cardiomegaly with findings of interstitial edema. Support devices as   above.      Electronically Signed by: ELIZABETH GARCIA MD    Signed on: 3/4/2021 9:34 AM          Cath/PV Case   Final Result      XR CHEST PA AND LATERAL 2 VIEWS   Final Result   1.    Mild cardiomegaly and pulmonary vascular congestion.         Electronically Signed by: CHEYANNE IRENE MD    Signed on: 3/3/2021 7:08 AM                Assessment/Plan:    Acute on chronic systolic CHF. NICM. HFrEF  Admitted with cardiac decompensation, hypervolemia, hypoxia, leg edema  Prior RHC Jan 2020 with Severely elevated intracardiac filling pressures, low CO/CI.   RHC on 3/3/21: Severely elevated RA/RV/PA/PCW pressures, severely reduced CO/CI, Mixed venous 38%  Home regimen included torsemide, aldactone, metolazone twice weekly, Entresto, metoprolol  Now on: Milrinone, iv Lasix drip. BP stable. Plan for LVAD surgery    Single chamber ICD since Jan 2020. Repeat ECHO with EF 15%, mod MR/TR  Cardiology evaluation, LVAD work-up completed.   Not candidate for OHT d/t elevated BMI, HIV  ICD Device recently interrogated   CT scans were unremarkable.       Acute Resp Failure, in the context of CHF exacerbation, pt also with COPD: stable, O2NC prn  Wean O2 as tolerated, diuresis,  inhalers, nebs prn.      OMAR. Had mild elevated Cr, suspect in the context of CHF. Cr back to baseline after diuresis  Low K, Mag: lytes replaced  HypoNa: treat CHF. Adjust diuresis, tolvaptan      Hx of PE:  recent CT chest no PE, US dopler no DVT  Pt was on coumadin, now on Hep drip     Hx of asthma/COPD: O2NC prn, nebs, inhalers. No evid of acute exacerbation     Hypertension. BP controled, GDMT now on hold.      Hx of CVA 10/2019, status post TPA and thrombectomy  recovered well, no residual deficits     DL: on statin.     vit D deficiency: recent level 14.2, on suplements  Anemia. of CD and iron deficiency. Recent iron panel noted. Hg stable     HIV, on BRITTON therapy, resume home meds. Labs OK, seen by ID     Thigh lesions, on both thighs, seen by ID, Derm, Onc  Biopsy done, inflammatory changes     GI and DVT prophylaxis       Code Status    Code Status: Full Resuscitation      3/12/2021 6:39 PM    I have spent greater than 35 min performing face-to-face patient care, including rendering the following critical care: Reviewed all the radiological studies available and their interpretation, reviewed  all the available labwork and all the pertinent consultant's notes. I have also answered all the patient/relatives questions.Pt is critically ill as documented above and requires high complexity medical decision making and active titration of therapies to preserve life.  04/04/21           negative Regular rate & rhythm, normal S1, S2; no murmurs, gallops or rubs; no S3, S4

## 2023-11-21 ENCOUNTER — APPOINTMENT (OUTPATIENT)
Dept: OTOLARYNGOLOGY | Facility: CLINIC | Age: 38
End: 2023-11-21

## 2024-01-03 NOTE — H&P ADULT - SKIN
_  Medication List        Prepared on: 01/03/2024     Bring your Medication List when you go to the doctor, hospital, or   emergency room. And, share it with your family or caregivers.     Note any changes to how you take your medications.  Cross out medications when you no longer use them.    Medication How I take it Why I use it Prescriber   alcohol swab prep pads Use to swab area of injection/katharine as directed. Controlled type 2 diabetes mellitus with stage 3 chronic kidney disease, without long-term current use of insulin (H) Dianna Lucero MD   blood glucose (ACCU-CHEK FASTCLIX) lancing device Lancing device to be used with lancets. Controlled type 2 diabetes mellitus with stage 3 chronic kidney disease, without long-term current use of insulin (H) Dianna Lucero MD   blood glucose (NO BRAND SPECIFIED) test strip Use to test blood sugar 2 times daily or as directed. Controlled type 2 diabetes mellitus with stage 3 chronic kidney disease, without long-term current use of insulin (H) Gomez Blevins MD   blood glucose monitoring (ACCU-CHEK FASTCLIX) lancets Use to test blood sugar 2 times daily before meals Controlled type 2 diabetes mellitus with stage 3 chronic kidney disease, without long-term current use of insulin (H) Dianna Lucero MD   blood glucose monitoring (NO BRAND SPECIFIED) meter device kit Use to test blood sugar 2 times daily before meals or as directed. Controlled type 2 diabetes mellitus with stage 3 chronic kidney disease, without long-term current use of insulin (H) Dianna Lucero MD   Continuous Blood Gluc Sensor (DEXCOM G6 SENSOR) MISC Change every 10 days. Controlled type 2 diabetes mellitus with stage 3 chronic kidney disease, without long-term current use of insulin (H) Dianna Lucero MD   Continuous Blood Gluc Sensor (FREESTYLE MODESTO 2 SENSOR) MISC Change every 14 days. Controlled type 2 diabetes mellitus with stage 3 chronic kidney disease, without long-term  current use of insulin (H) Dianna Lucero MD   ferrous sulfate 325 (65 FE) MG tablet Take 1 tablet by mouth daily (with breakfast)  Anemia Gomez Blevins MD   glipiZIDE (GLUCOTROL XL) 5 MG 24 hr tablet Take 1 tablet (5 mg) by mouth daily Controlled type 2 diabetes mellitus with stage 3 chronic kidney disease, without long-term current use of insulin (H) Gomez Blevins MD   hydrochlorothiazide (HYDRODIURIL) 12.5 MG tablet Take 1 tablet (12.5 mg) by mouth daily Essential hypertension with goal blood pressure less than 140/90 Gomez Blevins MD   lisinopril (ZESTRIL) 40 MG tablet Take 1 tablet (40 mg) by mouth daily Essential hypertension with goal blood pressure less than 140/90 Gomez Blevins MD   Multiple Vitamins-Minerals (MULTIVITAMIN ADULTS PO) Take by mouth daily  General Health Gomez Blevins MD   potassium chloride ER (KLOR-CON M) 20 MEQ CR tablet Take 1 tablet (20 mEq) by mouth daily Hypokalemia Dianna Lucero MD   simvastatin (ZOCOR) 10 MG tablet TAKE 1 TABLET AT BEDTIME Controlled type 2 diabetes mellitus with stage 3 chronic kidney disease, without long-term current use of insulin (H) Dianna Lucero MD         Add new medications, over-the-counter drugs, herbals, vitamins, or  minerals in the blank rows below.    Medication How I take it Why I use it Prescriber                                      Allergies:      penicillins        Side effects I have had:               Other Information:              My notes and questions:   No lesions; no rash

## 2024-04-26 ENCOUNTER — NON-APPOINTMENT (OUTPATIENT)
Age: 39
End: 2024-04-26

## 2024-04-27 ENCOUNTER — EMERGENCY (EMERGENCY)
Facility: HOSPITAL | Age: 39
LOS: 1 days | Discharge: HOME | End: 2024-04-27
Attending: STUDENT IN AN ORGANIZED HEALTH CARE EDUCATION/TRAINING PROGRAM
Payer: COMMERCIAL

## 2024-04-27 VITALS
OXYGEN SATURATION: 98 % | HEIGHT: 62 IN | WEIGHT: 136.91 LBS | DIASTOLIC BLOOD PRESSURE: 76 MMHG | TEMPERATURE: 98 F | SYSTOLIC BLOOD PRESSURE: 111 MMHG | HEART RATE: 70 BPM | RESPIRATION RATE: 18 BRPM

## 2024-04-27 LAB
ALBUMIN SERPL ELPH-MCNC: 4.1 G/DL — SIGNIFICANT CHANGE UP (ref 3.3–5)
ALP SERPL-CCNC: 58 U/L — SIGNIFICANT CHANGE UP (ref 40–120)
ALT FLD-CCNC: 19 U/L — SIGNIFICANT CHANGE UP (ref 10–45)
ANION GAP SERPL CALC-SCNC: 9 MMOL/L — SIGNIFICANT CHANGE UP (ref 5–17)
AST SERPL-CCNC: 18 U/L — SIGNIFICANT CHANGE UP (ref 10–40)
BILIRUB SERPL-MCNC: 0.4 MG/DL — SIGNIFICANT CHANGE UP (ref 0.2–1.2)
BUN SERPL-MCNC: 18 MG/DL — SIGNIFICANT CHANGE UP (ref 7–23)
CALCIUM SERPL-MCNC: 9.3 MG/DL — SIGNIFICANT CHANGE UP (ref 8.4–10.5)
CHLORIDE SERPL-SCNC: 103 MMOL/L — SIGNIFICANT CHANGE UP (ref 96–108)
CO2 SERPL-SCNC: 26 MMOL/L — SIGNIFICANT CHANGE UP (ref 22–31)
CREAT SERPL-MCNC: 0.65 MG/DL — SIGNIFICANT CHANGE UP (ref 0.5–1.3)
EGFR: 114 ML/MIN/1.73M2 — SIGNIFICANT CHANGE UP
GLUCOSE SERPL-MCNC: 89 MG/DL — SIGNIFICANT CHANGE UP (ref 70–99)
HCG SERPL-ACNC: <2 MIU/ML — SIGNIFICANT CHANGE UP
POTASSIUM SERPL-MCNC: 3.7 MMOL/L — SIGNIFICANT CHANGE UP (ref 3.5–5.3)
POTASSIUM SERPL-SCNC: 3.7 MMOL/L — SIGNIFICANT CHANGE UP (ref 3.5–5.3)
PROT SERPL-MCNC: 6.4 G/DL — SIGNIFICANT CHANGE UP (ref 6–8.3)
SODIUM SERPL-SCNC: 138 MMOL/L — SIGNIFICANT CHANGE UP (ref 135–145)

## 2024-04-27 PROCEDURE — 84702 CHORIONIC GONADOTROPIN TEST: CPT

## 2024-04-27 PROCEDURE — 85025 COMPLETE CBC W/AUTO DIFF WBC: CPT

## 2024-04-27 PROCEDURE — 99283 EMERGENCY DEPT VISIT LOW MDM: CPT | Mod: 25

## 2024-04-27 PROCEDURE — 99284 EMERGENCY DEPT VISIT MOD MDM: CPT

## 2024-04-27 PROCEDURE — 80053 COMPREHEN METABOLIC PANEL: CPT

## 2024-04-27 PROCEDURE — 93005 ELECTROCARDIOGRAM TRACING: CPT

## 2024-04-27 PROCEDURE — 84443 ASSAY THYROID STIM HORMONE: CPT

## 2024-04-27 RX ORDER — SODIUM CHLORIDE 9 MG/ML
1000 INJECTION INTRAMUSCULAR; INTRAVENOUS; SUBCUTANEOUS ONCE
Refills: 0 | Status: COMPLETED | OUTPATIENT
Start: 2024-04-27 | End: 2024-04-27

## 2024-04-27 RX ADMIN — SODIUM CHLORIDE 1000 MILLILITER(S): 9 INJECTION INTRAMUSCULAR; INTRAVENOUS; SUBCUTANEOUS at 23:14

## 2024-04-27 NOTE — ED PROVIDER NOTE - PATIENT PORTAL LINK FT
You can access the FollowMyHealth Patient Portal offered by [facility] by registering at the following website: [email or web address]. By joining [facility]’s FollowMyHealth portal, you will also be able to view your health information using other applications (apps) compatible with our system.

## 2024-04-27 NOTE — ED PROVIDER NOTE - NSFOLLOWUPINSTRUCTIONS_ED_ALL_ED_FT
You were seen in the ER today for fatigue and low blood pressure.    Your blood work today was within normal limits.  Results of all the testing was performed today are included in this paperwork.    Follow-up with your primary physician as scheduled to be reevaluated.  They may order additional testing to help determine the cause of your symptoms.    Stay well-hydrated.    If you develop any new or worsening symptoms including fevers, vomiting, diarrhea, loss of consciousness, or if you are otherwise concerned, come back to the ER right away to be reevaluated.

## 2024-04-27 NOTE — ED ADULT NURSE NOTE - OBJECTIVE STATEMENT
39 yo FM AOx4 from home with no pertinent PMH c/o dizziness, lightheadedness, weakness, and low BP. Pt reports symptoms started 8 days ago. Pt states "the lowest my BP was 85/45 at home". Pt endorses headache 3/10. Pt initially presents to [facility] ED in no acute distress with unlabored breathing. Pt abdomen soft and nondistended. Stretcher in lowest position locked with blanket given. Comfort care and safety measures provided. Pt denies c/p, sob, abd pain, N/V/D, fevers, chills, headache, dysuria, blood in urine and stool.

## 2024-04-27 NOTE — ED PROVIDER NOTE - PHYSICAL EXAMINATION
GENERAL: Awake, alert, NAD  HEAD: NC/AT, neck supple, moist mucous membranes  EYES: PERRL, EOM grossly intact, sclera anicteric  LUNGS: normal WOB on RA, CTAB, no wheezes or crackles   CARDIAC: RRR, no m/r/g  ABDOMEN: Soft, non tender, non distended, no rebound, no guarding  EXT: No edema, no calf tenderness, no deformities.  NEURO: A&Ox3. Moving all extremities.  SKIN: Warm and dry. No rash.  PSYCH: Normal affect.

## 2024-04-27 NOTE — ED PROVIDER NOTE - CLINICAL SUMMARY MEDICAL DECISION MAKING FREE TEXT BOX
38-year-old female with no reported PMH presenting with 8 days of persistent weakness and generalized fatigue in the setting of multiple low BP readings. On exam, she is well appearing although mild anxious, BP here 90s/60s, not tachy, suspect near normal baseline BP. Will check basics including TSH to assess for underlying abnormality such as anemia or electrolyte disturbance. IVF bolus ordered to see if BP improves. Anticipate DC after fluids, labs.

## 2024-04-27 NOTE — ED CLERICAL - NS ED CLERK NOTE PRE-ARRIVAL INFORMATION; ADDITIONAL PRE-ARRIVAL INFORMATION
CC/Reason For referral: Dizziness and hypotension, lower BP than usual.  Preferred Consultant(if applicable):  Who admits for you (if needed):  Do you have documents you would like to fax over?  Would you still like to speak to an ED attending? no

## 2024-04-27 NOTE — ED PROVIDER NOTE - OBJECTIVE STATEMENT
38Y F no reported PMH presenting with generalized weakness/fatigue and hypotension. 38Y F no reported PMH presenting with generalized weakness/fatigue and hypotension. Patient reports feeling increasing fatigue over the last 8 days. She 38Y F no reported PMH presenting with generalized weakness/fatigue and hypotension. Patient reports feeling increasing fatigue over the last 8 days. She has been monitoring blood pressure at home, notes that she typically runs low, 90s over 60s however over the last 8 days she has consistently been 80s over 40s.  Is still able to perform ADLs, notes that she ran 2 miles today and has been hydrating adequately.  No other associated symptoms including chest pain, shortness of breath, nausea/vomiting, diarrhea, urinary symptoms, worsening leg pain or swelling.  She notes that she has an appoint with her primary doctor next week however is going to [place] tomorrow so wanted to be checked out prior to her trip.

## 2024-04-27 NOTE — ED PROVIDER NOTE - ATTENDING CONTRIBUTION TO CARE
I was the supervising attending. I have independently seen face-to-face and examined the patient. I have reviewed the history and physical and discussed the MDM with the resident, fellow, KATIE and/or student. I agree with the assessment and plan as presented unless otherwise documented as follows:    38F denies PMH presenting w/ generalized fatigue and reported low BP over last 8d. States her BP is typically low, approximately 90/60s, but recently has been feeling fatigued and noting readings to 80/40s. Otherwise denies fevers/chills, chest/abdominal pain, SOB, cough, congestion, urinary complaints, diarrhea, has had normal appetite and eating/drinking normally, otherwise performing IADLs/ADLs w/o significant limitation. Appears well, AOx3, not in acute distress. BPs here 110/70s. Low suspicion for hypovolemia given no reported insensible losses or decreased PO intake, low suspicion for sepsis given lack of infectious symptoms, all vitals reassuring here, exam WNL. Suspect more likely within patient's normal, will screen for electrolyte abnormalities with labs, give IVF. -Carmela Overton MD (Attending)

## 2024-04-28 VITALS
OXYGEN SATURATION: 98 % | DIASTOLIC BLOOD PRESSURE: 69 MMHG | HEART RATE: 82 BPM | RESPIRATION RATE: 18 BRPM | TEMPERATURE: 98 F | SYSTOLIC BLOOD PRESSURE: 101 MMHG

## 2024-04-28 LAB
BASOPHILS # BLD AUTO: 0.03 K/UL — SIGNIFICANT CHANGE UP (ref 0–0.2)
BASOPHILS NFR BLD AUTO: 0.4 % — SIGNIFICANT CHANGE UP (ref 0–2)
EOSINOPHIL # BLD AUTO: 0.38 K/UL — SIGNIFICANT CHANGE UP (ref 0–0.5)
EOSINOPHIL NFR BLD AUTO: 5.1 % — SIGNIFICANT CHANGE UP (ref 0–6)
HCT VFR BLD CALC: 41.1 % — SIGNIFICANT CHANGE UP (ref 34.5–45)
HGB BLD-MCNC: 13.7 G/DL — SIGNIFICANT CHANGE UP (ref 11.5–15.5)
IMM GRANULOCYTES NFR BLD AUTO: 0.3 % — SIGNIFICANT CHANGE UP (ref 0–0.9)
LYMPHOCYTES # BLD AUTO: 2.54 K/UL — SIGNIFICANT CHANGE UP (ref 1–3.3)
LYMPHOCYTES # BLD AUTO: 33.8 % — SIGNIFICANT CHANGE UP (ref 13–44)
MCHC RBC-ENTMCNC: 28.7 PG — SIGNIFICANT CHANGE UP (ref 27–34)
MCHC RBC-ENTMCNC: 33.3 GM/DL — SIGNIFICANT CHANGE UP (ref 32–36)
MCV RBC AUTO: 86.2 FL — SIGNIFICANT CHANGE UP (ref 80–100)
MONOCYTES # BLD AUTO: 0.51 K/UL — SIGNIFICANT CHANGE UP (ref 0–0.9)
MONOCYTES NFR BLD AUTO: 6.8 % — SIGNIFICANT CHANGE UP (ref 2–14)
NEUTROPHILS # BLD AUTO: 4.03 K/UL — SIGNIFICANT CHANGE UP (ref 1.8–7.4)
NEUTROPHILS NFR BLD AUTO: 53.6 % — SIGNIFICANT CHANGE UP (ref 43–77)
NRBC # BLD: 0 /100 WBCS — SIGNIFICANT CHANGE UP (ref 0–0)
PLATELET # BLD AUTO: 250 K/UL — SIGNIFICANT CHANGE UP (ref 150–400)
RBC # BLD: 4.77 M/UL — SIGNIFICANT CHANGE UP (ref 3.8–5.2)
RBC # FLD: 12.6 % — SIGNIFICANT CHANGE UP (ref 10.3–14.5)
TSH SERPL-MCNC: 6.98 UIU/ML — HIGH (ref 0.27–4.2)
WBC # BLD: 7.51 K/UL — SIGNIFICANT CHANGE UP (ref 3.8–10.5)
WBC # FLD AUTO: 7.51 K/UL — SIGNIFICANT CHANGE UP (ref 3.8–10.5)

## 2024-07-10 ENCOUNTER — APPOINTMENT (OUTPATIENT)
Dept: OBGYN | Facility: CLINIC | Age: 39
End: 2024-07-10

## 2024-08-28 ENCOUNTER — APPOINTMENT (OUTPATIENT)
Dept: OBGYN | Facility: CLINIC | Age: 39
End: 2024-08-28

## 2024-08-28 NOTE — PLAN
[FreeTextEntry1] : Patient is a 39 year old, , seen for well woman annual exam.  1.Well person exam -Medical/surgical/family history reviewed -Allergies and medications reconciled -Pap (-)/(+), HPV (-) (+), due___ -STI testing - -Reviewed breast awareness/calcium/vitamin D/weight bearing exercise   2.menopause - denies postmenopausal bleeding   3.Health Care Maintenance -Mammogram + breast u/s: referral given, list of radiology offices with phone numbers/addresses provided -Colonoscopy: due in -Cholesterol/vaccinations per PCP - COVID vaccination reviewed  4. Depression - pt screened for depression, no signs of clinical depression. PHQ-9 score reviewed over the course of the visit. -5-10 minutes of face to face time. -follow up with changes in mood including other symptoms of anxiety all questions/concerns of pt addressed to their satisfaction

## 2024-08-28 NOTE — HISTORY OF PRESENT ILLNESS
[FreeTextEntry1] : Patient is a 39 year old, , presenting for well woman annual exam.   Last annual:  LMP:  GYNHx: denies hx of abnormal pap smears denies fibroids/cysts/endometriosis denies STIs  OBHx:  Colonoscopy - Mammogram -   Social Hx: lives with: works/education:  diet/exercise: ETOH/smoking/drug use:  Sexual Hx: Contraception: IUC placed 2017; good until  Concerns:  Patient denies any known exposure or signs/symptoms of COVID-19 at this time.  Patient is vaccinated against COVID-19.   PCP -  Last physical exam:

## 2024-10-02 ENCOUNTER — APPOINTMENT (OUTPATIENT)
Dept: OBGYN | Facility: CLINIC | Age: 39
End: 2024-10-02

## 2025-09-05 ENCOUNTER — NON-APPOINTMENT (OUTPATIENT)
Age: 40
End: 2025-09-05